# Patient Record
Sex: MALE | Race: WHITE | Employment: FULL TIME | URBAN - NONMETROPOLITAN AREA
[De-identification: names, ages, dates, MRNs, and addresses within clinical notes are randomized per-mention and may not be internally consistent; named-entity substitution may affect disease eponyms.]

---

## 2018-02-11 ENCOUNTER — HOSPITAL ENCOUNTER (EMERGENCY)
Age: 48
Discharge: HOME | End: 2018-02-11
Payer: COMMERCIAL

## 2018-02-11 VITALS
RESPIRATION RATE: 20 BRPM | HEART RATE: 96 BPM | OXYGEN SATURATION: 96 % | BODY MASS INDEX: 30.16 KG/M2 | HEIGHT: 74 IN | WEIGHT: 235 LBS | SYSTOLIC BLOOD PRESSURE: 146 MMHG | DIASTOLIC BLOOD PRESSURE: 92 MMHG | TEMPERATURE: 101.7 F

## 2018-02-11 DIAGNOSIS — J10.1 INFLUENZA B: Primary | ICD-10-CM

## 2018-02-11 LAB
FLU A ANTIGEN: NEGATIVE
FLU B ANTIGEN: POSITIVE
GROUP A STREP CULTURE, REFLEX: NEGATIVE
REFLEX THROAT C + S: NORMAL

## 2018-02-11 PROCEDURE — 87070 CULTURE OTHR SPECIMN AEROBIC: CPT

## 2018-02-11 PROCEDURE — 87804 INFLUENZA ASSAY W/OPTIC: CPT

## 2018-02-11 PROCEDURE — 99213 OFFICE O/P EST LOW 20 MIN: CPT

## 2018-02-11 PROCEDURE — 99213 OFFICE O/P EST LOW 20 MIN: CPT | Performed by: NURSE PRACTITIONER

## 2018-02-11 PROCEDURE — 87880 STREP A ASSAY W/OPTIC: CPT

## 2018-02-11 RX ORDER — OSELTAMIVIR PHOSPHATE 75 MG/1
75 CAPSULE ORAL 2 TIMES DAILY
Qty: 10 CAPSULE | Refills: 0 | Status: SHIPPED | OUTPATIENT
Start: 2018-02-11 | End: 2018-02-16

## 2018-02-11 ASSESSMENT — ENCOUNTER SYMPTOMS
RHINORRHEA: 0
TROUBLE SWALLOWING: 1
COUGH: 1

## 2018-02-11 ASSESSMENT — PAIN DESCRIPTION - PROGRESSION: CLINICAL_PROGRESSION: NOT CHANGED

## 2018-02-11 ASSESSMENT — PAIN DESCRIPTION - FREQUENCY: FREQUENCY: CONTINUOUS

## 2018-02-11 ASSESSMENT — PAIN DESCRIPTION - DESCRIPTORS: DESCRIPTORS: BURNING

## 2018-02-11 ASSESSMENT — PAIN DESCRIPTION - LOCATION: LOCATION: THROAT

## 2018-02-11 ASSESSMENT — PAIN SCALES - GENERAL: PAINLEVEL_OUTOF10: 6

## 2018-02-11 NOTE — ED PROVIDER NOTES
AYSHA Morgan JENNIFER Panchal [address]  Urgent Care Encounter      CHIEF COMPLAINT       Chief Complaint   Patient presents with    Generalized Body Aches     fears flu    Fatigue    Pharyngitis       Nurses Notes reviewed and I agree except as noted in the HPI. HISTORY OF PRESENT ILLNESS   Tracy Copeland is a 57 y.o. male who presents Sore throat, body aches, fever, fatigue x 3 days. No  was used. Pharyngitis   Location:  Anterior  Quality:  Sharp  Severity:  Severe  Onset quality:  Sudden  Duration:  3 days  Timing:  Intermittent (coughing)  Progression:  Waxing and waning  Chronicity:  New  Relieved by:  Nothing  Exacerbated by: coughing. Ineffective treatments:  None tried  Associated symptoms: chills, cough, fever, headaches and trouble swallowing    Associated symptoms: no rhinorrhea          REVIEW OF SYSTEMS     Review of Systems   Constitutional: Positive for chills and fever. HENT: Positive for trouble swallowing. Negative for rhinorrhea. Respiratory: Positive for cough. Musculoskeletal: Positive for myalgias. Neurological: Positive for headaches. All other systems reviewed and are negative. PAST MEDICAL HISTORY   History reviewed. No pertinent past medical history. SURGICAL HISTORY     Patient  has a past surgical history that includes Vasectomy (2000); cyst removal (1989); hernia repair (2008); and Spine surgery (03/2016). CURRENT MEDICATIONS       Previous Medications    No medications on file       ALLERGIES     Patient is has No Known Allergies. FAMILY HISTORY     Patient's family history includes Cancer in his maternal grandfather; Heart Disease in his father, maternal aunt, and maternal grandfather; High Blood Pressure in his father; High Cholesterol in his father; Stroke in his maternal grandfather. SOCIAL HISTORY     Patient  reports that he has been smoking Cigarettes. He has a 14.00 pack-year smoking history.  He has never used smokeless DISPOSITION Decision To Discharge 02/11/2018 10:32:31 AM    PATIENT REFERRED TO:  Naa Loaiza MD  [facility]  [phone]    In 1 week  If symptoms worsen    DISCHARGE MEDICATIONS:  New Prescriptions    OSELTAMIVIR (TAMIFLU) 75 MG CAPSULE    Take 1 capsule by mouth 2 times daily for 5 days     Current Discharge Medication List          Laurel Fischer, 20 Marina Montenegro CNP  02/11/18 [address] [place] [address]  02/11/18 [facility]

## 2018-02-11 NOTE — ED NOTES
Pt. Released in stable condition, ambulated per self to private car. Instructed pt to follow-up with family doctor as needed for recheck or go directly to the emergency department for any concerns/worsening conditions. Pt. Verbalized understanding of instructions. No questions at this time. RX in hand.       Damon Bruno RN  02/11/18 8677

## 2018-02-13 LAB — THROAT/NOSE CULTURE: NORMAL

## 2018-03-20 ENCOUNTER — HOSPITAL ENCOUNTER (EMERGENCY)
Age: 48
Discharge: HOME | End: 2018-03-20
Payer: COMMERCIAL

## 2018-03-20 ENCOUNTER — APPOINTMENT (OUTPATIENT)
Dept: GENERAL RADIOLOGY | Age: 48
End: 2018-03-20
Payer: COMMERCIAL

## 2018-03-20 VITALS
TEMPERATURE: 97.9 F | WEIGHT: 230 LBS | BODY MASS INDEX: 29.52 KG/M2 | DIASTOLIC BLOOD PRESSURE: 111 MMHG | SYSTOLIC BLOOD PRESSURE: 156 MMHG | HEART RATE: 96 BPM | OXYGEN SATURATION: 96 % | RESPIRATION RATE: 18 BRPM | HEIGHT: 74 IN

## 2018-03-20 DIAGNOSIS — S61.311A LACERATION OF LEFT INDEX FINGER WITH DAMAGE TO NAIL, FOREIGN BODY PRESENCE UNSPECIFIED, INITIAL ENCOUNTER: Primary | ICD-10-CM

## 2018-03-20 DIAGNOSIS — S62.631B OPEN DISPLACED FRACTURE OF DISTAL PHALANX OF LEFT INDEX FINGER, INITIAL ENCOUNTER: ICD-10-CM

## 2018-03-20 PROCEDURE — 12002 RPR S/N/AX/GEN/TRNK2.6-7.5CM: CPT

## 2018-03-20 PROCEDURE — 99282 EMERGENCY DEPT VISIT SF MDM: CPT

## 2018-03-20 PROCEDURE — 90471 IMMUNIZATION ADMIN: CPT | Performed by: NURSE PRACTITIONER

## 2018-03-20 PROCEDURE — 73140 X-RAY EXAM OF FINGER(S): CPT

## 2018-03-20 PROCEDURE — 90715 TDAP VACCINE 7 YRS/> IM: CPT | Performed by: NURSE PRACTITIONER

## 2018-03-20 PROCEDURE — 6360000002 HC RX W HCPCS: Performed by: NURSE PRACTITIONER

## 2018-03-20 RX ORDER — CEPHALEXIN 500 MG/1
500 CAPSULE ORAL 4 TIMES DAILY
Qty: 28 CAPSULE | Refills: 0 | Status: SHIPPED | OUTPATIENT
Start: 2018-03-20 | End: 2018-03-27

## 2018-03-20 RX ORDER — HYDROCODONE BITARTRATE AND ACETAMINOPHEN 5; 325 MG/1; MG/1
1 TABLET ORAL EVERY 6 HOURS PRN
Qty: 12 TABLET | Refills: 0 | Status: SHIPPED | OUTPATIENT
Start: 2018-03-20 | End: 2018-03-23

## 2018-03-20 RX ORDER — LIDOCAINE HYDROCHLORIDE 10 MG/ML
INJECTION, SOLUTION INFILTRATION; PERINEURAL
Status: DISCONTINUED
Start: 2018-03-20 | End: 2018-03-20 | Stop reason: HOSPADM

## 2018-03-20 RX ADMIN — TETANUS TOXOID, REDUCED DIPHTHERIA TOXOID AND ACELLULAR PERTUSSIS VACCINE, ADSORBED 0.5 ML: 5; 2.5; 8; 8; 2.5 SUSPENSION INTRAMUSCULAR at 17:52

## 2018-03-20 ASSESSMENT — PAIN DESCRIPTION - FREQUENCY: FREQUENCY: CONTINUOUS

## 2018-03-20 ASSESSMENT — PAIN DESCRIPTION - PAIN TYPE: TYPE: ACUTE PAIN

## 2018-03-20 ASSESSMENT — PAIN DESCRIPTION - ORIENTATION: ORIENTATION: LEFT

## 2018-03-20 ASSESSMENT — PAIN SCALES - GENERAL: PAINLEVEL_OUTOF10: 8

## 2018-03-20 ASSESSMENT — PAIN DESCRIPTION - LOCATION: LOCATION: FINGER (COMMENT WHICH ONE)

## 2018-03-20 ASSESSMENT — PAIN DESCRIPTION - DESCRIPTORS: DESCRIPTORS: BURNING

## 2018-03-20 NOTE — ED TRIAGE NOTES
Patient presents to the ED with complaints of a left, pointer finger injury. States he was throwing logs onto the back of his truck when he smashed his finger between a log and the tail-gait of his truck. Unsure of when last tetanus vaccination was given. Has not taken anything for the pain. No other questions or concerns at this time. Call light within reach.

## 2018-03-21 NOTE — ED PROVIDER NOTES
DEPARTMENT COURSE:   Vitals:    Vitals:    03/20/18 1725   BP: (!) 156/111   Pulse: 96   Resp: 18   Temp: 97.9 °F (36.6 °C)   TempSrc: Oral   SpO2: 96%   Weight: 230 lb (104.3 kg)   Height: 6' 2\" (1.88 m)       MDM    Patient seen and evaluated in the emergency department, patient appeared be no acute distress. Physical exam reveals a left finger that has a laceration and deformity. There is noted displacement of the fingernail bed. The hands are lightly soiled. Patient had a digital block completed, and x-ray, noted distal phalanx tuft fracture. No foreign bodies are noted. Patient's hand was placed in a 1:1:1 bath of Betadine, I didn't peroxide, and sterile water. Soaked for approximately 15 minutes. It was removed, additional anesthetic was applied. Reduction of the fracture and fingernail was completed, sutures were placed for additional support. Patient tolerated procedure well, I discussed my findings my plan of care the patient he was amenable with discharge. We will prophylactically put him on antibiotics, he is advised follow-up with orthopedics tomorrow appointment was made. He verbalized understanding of plan of care. All here in the emergency room he maintained stable course is appropriate for discharge. Medications   Tetanus-Diphth-Acell Pertussis (BOOSTRIX) injection 0.5 mL (0.5 mLs Intramuscular Given 3/20/18 1752)       Patient was seen independently by myself. The patient's final impression and disposition and plan was determined by myself.      CRITICAL CARE:   None    CONSULTS:  None    PROCEDURES:  Lac Repair  Date/Time: 3/20/2018 9:20 PM  Performed by: Chelsy Long  Authorized by: Chelsy Long     Consent:     Consent obtained:  Verbal    Consent given by:  Patient    Risks discussed:  Infection, need for additional repair, nerve damage, pain, poor cosmetic result, poor wound healing, retained foreign body, tendon damage and vascular damage    Alternatives discussed:  No DISPOSITION/PLAN   Patient discharged in stable condition    PATIENT REFERRED TO:  Lena Rivers 92 6  [address] [facility] [address]  Go in 1 day  Appointment made for 3/21/18 at [facility]:  Discharge Medication List as of 3/20/2018  6:34 PM      START taking these medications    Details   cephALEXin (KEFLEX) 500 MG capsule Take 1 capsule by mouth 4 times daily for 7 days, Disp-28 capsule, R-0Print      HYDROcodone-acetaminophen (NORCO) 5-325 MG per tablet Take 1 tablet by mouth every 6 hours as needed for Pain for up to 3 days . Take lowest dose possible to manage pain., Disp-12 tablet, R-0Print             (Please note that portions of this note were completed with a voice recognition program.  Efforts were made to edit the dictations but occasionally words are mis-transcribed.)      Provider:  I personally performed the services described in the documentation, reviewed and edited the documentation which was dictated to the scribe in my presence, and it accurately records my words and actions.     Dylan Sherman CNP 03/20/18 9:14 PM    IVAN Segura NP  03/20/18 0920

## 2019-03-15 ENCOUNTER — OFFICE VISIT (OUTPATIENT)
Dept: FAMILY MEDICINE CLINIC | Age: 49
End: 2019-03-15
Payer: COMMERCIAL

## 2019-03-15 VITALS
TEMPERATURE: 98 F | BODY MASS INDEX: 28.22 KG/M2 | WEIGHT: 219.8 LBS | DIASTOLIC BLOOD PRESSURE: 78 MMHG | HEART RATE: 78 BPM | RESPIRATION RATE: 16 BRPM | SYSTOLIC BLOOD PRESSURE: 115 MMHG

## 2019-03-15 DIAGNOSIS — M72.2 PLANTAR FASCIITIS: Primary | ICD-10-CM

## 2019-03-15 PROCEDURE — 96372 THER/PROPH/DIAG INJ SC/IM: CPT | Performed by: FAMILY MEDICINE

## 2019-03-15 PROCEDURE — 99213 OFFICE O/P EST LOW 20 MIN: CPT | Performed by: FAMILY MEDICINE

## 2019-03-15 RX ORDER — METHYLPREDNISOLONE ACETATE 80 MG/ML
160 INJECTION, SUSPENSION INTRA-ARTICULAR; INTRALESIONAL; INTRAMUSCULAR; SOFT TISSUE ONCE
Status: COMPLETED | OUTPATIENT
Start: 2019-03-15 | End: 2019-03-15

## 2019-03-15 RX ADMIN — METHYLPREDNISOLONE ACETATE 160 MG: 80 INJECTION, SUSPENSION INTRA-ARTICULAR; INTRALESIONAL; INTRAMUSCULAR; SOFT TISSUE at 07:57

## 2019-03-15 ASSESSMENT — ENCOUNTER SYMPTOMS
ABDOMINAL DISTENTION: 0
RHINORRHEA: 0
DIARRHEA: 0
CONSTIPATION: 0
SORE THROAT: 0
COUGH: 0
SINUS PRESSURE: 0
EYE PAIN: 0
SHORTNESS OF BREATH: 0
ABDOMINAL PAIN: 0
NAUSEA: 0

## 2019-03-15 ASSESSMENT — PATIENT HEALTH QUESTIONNAIRE - PHQ9
SUM OF ALL RESPONSES TO PHQ QUESTIONS 1-9: 0
2. FEELING DOWN, DEPRESSED OR HOPELESS: 0
SUM OF ALL RESPONSES TO PHQ9 QUESTIONS 1 & 2: 0
SUM OF ALL RESPONSES TO PHQ QUESTIONS 1-9: 0
1. LITTLE INTEREST OR PLEASURE IN DOING THINGS: 0

## 2019-10-18 ENCOUNTER — OFFICE VISIT (OUTPATIENT)
Dept: FAMILY MEDICINE CLINIC | Age: 49
End: 2019-10-18
Payer: COMMERCIAL

## 2019-10-18 VITALS
TEMPERATURE: 98.7 F | BODY MASS INDEX: 29.16 KG/M2 | RESPIRATION RATE: 20 BRPM | HEIGHT: 73 IN | DIASTOLIC BLOOD PRESSURE: 94 MMHG | HEART RATE: 76 BPM | WEIGHT: 220 LBS | SYSTOLIC BLOOD PRESSURE: 130 MMHG

## 2019-10-18 DIAGNOSIS — R03.0 ELEVATED BLOOD PRESSURE READING: ICD-10-CM

## 2019-10-18 DIAGNOSIS — R53.83 FATIGUE, UNSPECIFIED TYPE: Primary | ICD-10-CM

## 2019-10-18 PROCEDURE — 99213 OFFICE O/P EST LOW 20 MIN: CPT | Performed by: FAMILY MEDICINE

## 2019-10-21 ENCOUNTER — NURSE ONLY (OUTPATIENT)
Dept: LAB | Age: 49
End: 2019-10-21

## 2019-10-21 DIAGNOSIS — R53.83 FATIGUE, UNSPECIFIED TYPE: ICD-10-CM

## 2019-10-21 LAB
ALBUMIN SERPL-MCNC: 4.5 G/DL (ref 3.5–5.1)
ALP BLD-CCNC: 62 U/L (ref 38–126)
ALT SERPL-CCNC: 20 U/L (ref 11–66)
ANION GAP SERPL CALCULATED.3IONS-SCNC: 12 MEQ/L (ref 8–16)
AST SERPL-CCNC: 23 U/L (ref 5–40)
BASOPHILS # BLD: 0.6 %
BASOPHILS ABSOLUTE: 0 THOU/MM3 (ref 0–0.1)
BILIRUB SERPL-MCNC: 0.6 MG/DL (ref 0.3–1.2)
BUN BLDV-MCNC: 16 MG/DL (ref 7–22)
CALCIUM SERPL-MCNC: 9.3 MG/DL (ref 8.5–10.5)
CHLORIDE BLD-SCNC: 103 MEQ/L (ref 98–111)
CHOLESTEROL, TOTAL: 151 MG/DL (ref 100–199)
CO2: 25 MEQ/L (ref 23–33)
CREAT SERPL-MCNC: 0.9 MG/DL (ref 0.4–1.2)
EOSINOPHIL # BLD: 0.8 %
EOSINOPHILS ABSOLUTE: 0.1 THOU/MM3 (ref 0–0.4)
ERYTHROCYTE [DISTWIDTH] IN BLOOD BY AUTOMATED COUNT: 12.6 % (ref 11.5–14.5)
ERYTHROCYTE [DISTWIDTH] IN BLOOD BY AUTOMATED COUNT: 44.3 FL (ref 35–45)
GFR SERPL CREATININE-BSD FRML MDRD: > 90 ML/MIN/1.73M2
GLUCOSE BLD-MCNC: 85 MG/DL (ref 70–108)
HCT VFR BLD CALC: 45.1 % (ref 42–52)
HDLC SERPL-MCNC: 37 MG/DL
HEMOGLOBIN: 14.7 GM/DL (ref 14–18)
IMMATURE GRANS (ABS): 0.01 THOU/MM3 (ref 0–0.07)
IMMATURE GRANULOCYTES: 0.1 %
LDL CHOLESTEROL CALCULATED: 93 MG/DL
LYMPHOCYTES # BLD: 27.1 %
LYMPHOCYTES ABSOLUTE: 1.9 THOU/MM3 (ref 1–4.8)
MCH RBC QN AUTO: 31.4 PG (ref 26–33)
MCHC RBC AUTO-ENTMCNC: 32.6 GM/DL (ref 32.2–35.5)
MCV RBC AUTO: 96.4 FL (ref 80–94)
MONOCYTES # BLD: 8.3 %
MONOCYTES ABSOLUTE: 0.6 THOU/MM3 (ref 0.4–1.3)
NUCLEATED RED BLOOD CELLS: 0 /100 WBC
PLATELET # BLD: 224 THOU/MM3 (ref 130–400)
PMV BLD AUTO: 9.9 FL (ref 9.4–12.4)
POTASSIUM SERPL-SCNC: 4.3 MEQ/L (ref 3.5–5.2)
RBC # BLD: 4.68 MILL/MM3 (ref 4.7–6.1)
SEG NEUTROPHILS: 63.1 %
SEGMENTED NEUTROPHILS ABSOLUTE COUNT: 4.5 THOU/MM3 (ref 1.8–7.7)
SODIUM BLD-SCNC: 140 MEQ/L (ref 135–145)
T4 FREE: 1.2 NG/DL (ref 0.93–1.76)
TOTAL PROTEIN: 7 G/DL (ref 6.1–8)
TRIGL SERPL-MCNC: 104 MG/DL (ref 0–199)
TSH SERPL DL<=0.05 MIU/L-ACNC: 2.12 UIU/ML (ref 0.4–4.2)
WBC # BLD: 7.1 THOU/MM3 (ref 4.8–10.8)

## 2019-10-21 ASSESSMENT — ENCOUNTER SYMPTOMS
SHORTNESS OF BREATH: 0
NAUSEA: 0
COUGH: 0
SORE THROAT: 0
ABDOMINAL DISTENTION: 0
SINUS PRESSURE: 0
EYE PAIN: 0
DIARRHEA: 0
RHINORRHEA: 0
CONSTIPATION: 0
ABDOMINAL PAIN: 0

## 2019-12-06 ENCOUNTER — OFFICE VISIT (OUTPATIENT)
Dept: FAMILY MEDICINE CLINIC | Age: 49
End: 2019-12-06
Payer: COMMERCIAL

## 2019-12-06 VITALS
RESPIRATION RATE: 16 BRPM | SYSTOLIC BLOOD PRESSURE: 132 MMHG | WEIGHT: 230 LBS | HEART RATE: 81 BPM | BODY MASS INDEX: 30.14 KG/M2 | TEMPERATURE: 98.2 F | DIASTOLIC BLOOD PRESSURE: 98 MMHG

## 2019-12-06 DIAGNOSIS — I10 ESSENTIAL HYPERTENSION: Primary | ICD-10-CM

## 2019-12-06 PROCEDURE — 99213 OFFICE O/P EST LOW 20 MIN: CPT | Performed by: FAMILY MEDICINE

## 2019-12-06 RX ORDER — LISINOPRIL 10 MG/1
10 TABLET ORAL DAILY
Qty: 30 TABLET | Refills: 1 | Status: SHIPPED | OUTPATIENT
Start: 2019-12-06 | End: 2020-01-10 | Stop reason: SDUPTHER

## 2019-12-08 ASSESSMENT — ENCOUNTER SYMPTOMS
EYE PAIN: 0
COUGH: 0
NAUSEA: 0
CONSTIPATION: 0
ABDOMINAL DISTENTION: 0
SORE THROAT: 0
SINUS PRESSURE: 0
ABDOMINAL PAIN: 0
RHINORRHEA: 0
DIARRHEA: 0
SHORTNESS OF BREATH: 0

## 2020-01-10 ENCOUNTER — OFFICE VISIT (OUTPATIENT)
Dept: FAMILY MEDICINE CLINIC | Age: 50
End: 2020-01-10
Payer: COMMERCIAL

## 2020-01-10 VITALS
BODY MASS INDEX: 29.09 KG/M2 | WEIGHT: 222 LBS | SYSTOLIC BLOOD PRESSURE: 122 MMHG | HEART RATE: 71 BPM | DIASTOLIC BLOOD PRESSURE: 92 MMHG | RESPIRATION RATE: 16 BRPM | OXYGEN SATURATION: 98 %

## 2020-01-10 PROCEDURE — 99213 OFFICE O/P EST LOW 20 MIN: CPT | Performed by: FAMILY MEDICINE

## 2020-01-10 RX ORDER — LISINOPRIL 20 MG/1
20 TABLET ORAL DAILY
Qty: 30 TABLET | Refills: 5 | Status: SHIPPED | OUTPATIENT
Start: 2020-01-10 | End: 2020-08-17

## 2020-01-10 ASSESSMENT — PATIENT HEALTH QUESTIONNAIRE - PHQ9
SUM OF ALL RESPONSES TO PHQ QUESTIONS 1-9: 0
SUM OF ALL RESPONSES TO PHQ9 QUESTIONS 1 & 2: 0
1. LITTLE INTEREST OR PLEASURE IN DOING THINGS: 0
SUM OF ALL RESPONSES TO PHQ QUESTIONS 1-9: 0
2. FEELING DOWN, DEPRESSED OR HOPELESS: 0

## 2020-01-12 ASSESSMENT — ENCOUNTER SYMPTOMS
EYE PAIN: 0
NAUSEA: 0
SORE THROAT: 0
SINUS PRESSURE: 0
SHORTNESS OF BREATH: 0
ABDOMINAL DISTENTION: 0
ABDOMINAL PAIN: 0
DIARRHEA: 0
RHINORRHEA: 0
COUGH: 0
CONSTIPATION: 0

## 2020-01-12 NOTE — PROGRESS NOTES
Reviewed health maintenance. Patient agreedwith treatment plan. Follow up as directed. **This report has been created using voice recognition software. It may contain minor errorswhich are inherent in voice recognition technology. **       Electronically signed by Abril Omer MD on 1/12/2020 at 12:21 PM

## 2020-08-17 RX ORDER — LISINOPRIL 20 MG/1
TABLET ORAL
Qty: 30 TABLET | Refills: 0 | Status: SHIPPED | OUTPATIENT
Start: 2020-08-17 | End: 2020-08-21 | Stop reason: SDUPTHER

## 2020-08-21 ENCOUNTER — VIRTUAL VISIT (OUTPATIENT)
Dept: FAMILY MEDICINE CLINIC | Age: 50
End: 2020-08-21
Payer: COMMERCIAL

## 2020-08-21 PROCEDURE — 99213 OFFICE O/P EST LOW 20 MIN: CPT | Performed by: FAMILY MEDICINE

## 2020-08-21 RX ORDER — LISINOPRIL 20 MG/1
20 TABLET ORAL DAILY
Qty: 30 TABLET | Refills: 11 | Status: SHIPPED | OUTPATIENT
Start: 2020-08-21 | End: 2021-07-29 | Stop reason: SDUPTHER

## 2020-08-21 ASSESSMENT — ENCOUNTER SYMPTOMS
ABDOMINAL DISTENTION: 0
SINUS PRESSURE: 0
SHORTNESS OF BREATH: 0
SORE THROAT: 0
DIARRHEA: 0
NAUSEA: 0
COUGH: 0
CONSTIPATION: 0
EYE PAIN: 0
ABDOMINAL PAIN: 0
RHINORRHEA: 0

## 2020-08-21 NOTE — PROGRESS NOTES
2020    TELEHEALTH EVALUATION -- Audio/Visual (During IVUVA-06 public health emergency)    HPI:    Reina Webb (:  1970) has requested an audio/video evaluation for the following concern(s):    Follow-up hypertension. Blood pressures been running in the 130s over 80s and 90s at home. He is feeling well denies any symptoms taking medication regularly. 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 HENT: Negative for congestion, ear pain, postnasal drip, rhinorrhea, sinus pressure and sore throat. Eyes: Negative for pain and visual disturbance. Respiratory: Negative for cough and shortness of breath. Cardiovascular: Negative for chest pain. Gastrointestinal: Negative for abdominal distention, abdominal pain, constipation, diarrhea and nausea. Genitourinary: Negative for dysuria, frequency and urgency. Musculoskeletal: Negative for arthralgias. Skin: Negative for rash. Neurological: Negative for dizziness. Prior to Visit Medications    Medication Sig Taking? Authorizing Provider   lisinopril (PRINIVIL;ZESTRIL) 20 MG tablet Take 1 tablet by mouth daily Yes Mckayla Corona MD       Social History     Tobacco Use    Smoking status: Current Every Day Smoker     Packs/day: 0.50     Years: 15.00     Pack years: 7.50     Types: Cigarettes    Smokeless tobacco: Never Used    Tobacco comment: 10/18/19 down to 1ppd   Substance Use Topics    Alcohol use:  Yes     Alcohol/week: 0.0 standard drinks     Comment: social    Drug use: No        No Known Allergies, No past medical history on file.,   Past Surgical History:   Procedure Laterality Date    CYST REMOVAL      Lt Wrist    HERNIA REPAIR      SPINE SURGERY  2016    OIS - C4,5,6    VASECTOMY     ,   Social History     Tobacco Use    Smoking status: Current Every Day Smoker     Packs/day: 0.50     Years: 15.00     Pack years: 7.50     Types: Cigarettes    Smokeless tobacco: Never Used    Tobacco comment: 10/18/19 down to 1ppd   Substance Use Topics    Alcohol use: Yes     Alcohol/week: 0.0 standard drinks     Comment: social    Drug use: No   ,   Family History   Problem Relation Age of Onset    High Blood Pressure Father     High Cholesterol Father     Heart Disease Father     Heart Disease Maternal Aunt     Cancer Maternal Grandfather         lung    Stroke Maternal Grandfather     Heart Disease Maternal Grandfather    ,   Immunization History   Administered Date(s) Administered    Influenza Virus Vaccine 10/04/2019    Tdap (Boostrix, Adacel) 03/20/2018   ,   Health Maintenance   Topic Date Due    Pneumococcal 0-64 years Vaccine (1 of 1 - PPSV23) 01/21/1978    HIV screen  01/21/1987    Flu vaccine (1) 09/01/2020    Potassium monitoring  10/21/2020    Creatinine monitoring  10/21/2020    Lipid screen  10/21/2024    DTaP/Tdap/Td vaccine (2 - Td) 03/20/2028    Hepatitis A vaccine  Aged Out    Hepatitis B vaccine  Aged Out    Hib vaccine  Aged Out    Meningococcal (ACWY) vaccine  Aged Out       PHYSICAL EXAMINATION:  [ INSTRUCTIONS:  \"[x]\" Indicates a positive item  \"[]\" Indicates a negative item  -- DELETE ALL ITEMS NOT EXAMINED]  Vital Signs: (As obtained by patient/caregiver or practitioner observation)    Blood pressure- 130/88 Heart rate-    Respiratory rate-    Temperature-  Pulse oximetry-     Constitutional: [x] Appears well-developed and well-nourished [x] No apparent distress      [] Abnormal-   Mental status  [x] Alert and awake  [x] Oriented to person/place/time [x]Able to follow commands      Eyes:  EOM    [x]  Normal  [] Abnormal-  Sclera  [x]  Normal  [] Abnormal -         Discharge [x]  None visible  [] Abnormal -    HENT:   [x] Normocephalic, atraumatic.   [] Abnormal   [] Mouth/Throat: Mucous membranes are moist. External Ears [] Normal  [] Abnormal-     Neck: [] No visualized mass     Pulmonary/Chest: [x] Respiratory effort normal.  [x] No visualized signs of difficulty breathing or respiratory distress        [] Abnormal-      Musculoskeletal:   [] Normal gait with no signs of ataxia         [] Normal range of motion of neck        [] Abnormal-       Neurological:        [x] No Facial Asymmetry (Cranial nerve 7 motor function) (limited exam to video visit)          [x] No gaze palsy        [] Abnormal-         Skin:        [x] No significant exanthematous lesions or discoloration noted on facial skin         [] Abnormal-            Psychiatric:       [x] Normal Affect [] No Hallucinations        [] Abnormal-     Other pertinent observable physical exam findings-     ASSESSMENT/PLAN:   Diagnosis Orders   1. Essential hypertension  lisinopril (PRINIVIL;ZESTRIL) 20 MG tablet    Lipid Panel    Comprehensive Metabolic Panel    CBC Auto Differential     Requested Prescriptions     Signed Prescriptions Disp Refills    lisinopril (PRINIVIL;ZESTRIL) 20 MG tablet 30 tablet 11     Sig: Take 1 tablet by mouth daily     Orders Placed This Encounter   Procedures    Lipid Panel     Standing Status:   Future     Standing Expiration Date:   8/21/2021     Order Specific Question:   Is Patient Fasting?/# of Hours     Answer:   yes/12    Comprehensive Metabolic Panel     Standing Status:   Future     Standing Expiration Date:   8/21/2021    CBC Auto Differential     Standing Status:   Future     Standing Expiration Date:   8/21/2021         No follow-ups on file. Shruti Stock is a 52 y.o. male being evaluated by a Virtual Visit (video visit) encounter to address concerns as mentioned above. A caregiver was present when appropriate.  Due to this being a TeleHealth encounter (During [place] [address] public [facility] emergency), evaluation of the following organ systems was limited:

## 2021-06-15 ENCOUNTER — NURSE ONLY (OUTPATIENT)
Dept: LAB | Age: 51
End: 2021-06-15

## 2021-06-15 DIAGNOSIS — I10 ESSENTIAL HYPERTENSION: ICD-10-CM

## 2021-06-15 LAB
ALBUMIN SERPL-MCNC: 4.7 G/DL (ref 3.5–5.1)
ALP BLD-CCNC: 60 U/L (ref 38–126)
ALT SERPL-CCNC: 30 U/L (ref 11–66)
ANION GAP SERPL CALCULATED.3IONS-SCNC: 7 MEQ/L (ref 8–16)
AST SERPL-CCNC: 22 U/L (ref 5–40)
BASOPHILS # BLD: 0.8 %
BASOPHILS ABSOLUTE: 0.1 THOU/MM3 (ref 0–0.1)
BILIRUB SERPL-MCNC: 0.3 MG/DL (ref 0.3–1.2)
BUN BLDV-MCNC: 12 MG/DL (ref 7–22)
CALCIUM SERPL-MCNC: 9.6 MG/DL (ref 8.5–10.5)
CHLORIDE BLD-SCNC: 104 MEQ/L (ref 98–111)
CHOLESTEROL, TOTAL: 191 MG/DL (ref 100–199)
CO2: 27 MEQ/L (ref 23–33)
CREAT SERPL-MCNC: 0.9 MG/DL (ref 0.4–1.2)
EOSINOPHIL # BLD: 1.9 %
EOSINOPHILS ABSOLUTE: 0.1 THOU/MM3 (ref 0–0.4)
ERYTHROCYTE [DISTWIDTH] IN BLOOD BY AUTOMATED COUNT: 12.5 % (ref 11.5–14.5)
ERYTHROCYTE [DISTWIDTH] IN BLOOD BY AUTOMATED COUNT: 45 FL (ref 35–45)
GFR SERPL CREATININE-BSD FRML MDRD: 90 ML/MIN/1.73M2
GLUCOSE BLD-MCNC: 119 MG/DL (ref 70–108)
HCT VFR BLD CALC: 50.5 % (ref 42–52)
HDLC SERPL-MCNC: 40 MG/DL
HEMOGLOBIN: 16.3 GM/DL (ref 14–18)
IMMATURE GRANS (ABS): 0.02 THOU/MM3 (ref 0–0.07)
IMMATURE GRANULOCYTES: 0.3 %
LDL CHOLESTEROL CALCULATED: 119 MG/DL
LYMPHOCYTES # BLD: 31.2 %
LYMPHOCYTES ABSOLUTE: 2 THOU/MM3 (ref 1–4.8)
MCH RBC QN AUTO: 31.7 PG (ref 26–33)
MCHC RBC AUTO-ENTMCNC: 32.3 GM/DL (ref 32.2–35.5)
MCV RBC AUTO: 98.1 FL (ref 80–94)
MONOCYTES # BLD: 9 %
MONOCYTES ABSOLUTE: 0.6 THOU/MM3 (ref 0.4–1.3)
NUCLEATED RED BLOOD CELLS: 0 /100 WBC
PLATELET # BLD: 233 THOU/MM3 (ref 130–400)
PMV BLD AUTO: 9.6 FL (ref 9.4–12.4)
POTASSIUM SERPL-SCNC: 4.4 MEQ/L (ref 3.5–5.2)
RBC # BLD: 5.15 MILL/MM3 (ref 4.7–6.1)
SEG NEUTROPHILS: 56.8 %
SEGMENTED NEUTROPHILS ABSOLUTE COUNT: 3.7 THOU/MM3 (ref 1.8–7.7)
SODIUM BLD-SCNC: 138 MEQ/L (ref 135–145)
TOTAL PROTEIN: 7.2 G/DL (ref 6.1–8)
TRIGL SERPL-MCNC: 161 MG/DL (ref 0–199)
WBC # BLD: 6.5 THOU/MM3 (ref 4.8–10.8)

## 2021-07-28 RX ORDER — DEXAMETHASONE SODIUM PHOSPHATE 4 MG/ML
4 INJECTION, SOLUTION INTRA-ARTICULAR; INTRALESIONAL; INTRAMUSCULAR; INTRAVENOUS; SOFT TISSUE ONCE
Status: CANCELLED | OUTPATIENT
Start: 2021-07-28 | End: 2021-07-28

## 2021-07-29 ENCOUNTER — OFFICE VISIT (OUTPATIENT)
Dept: FAMILY MEDICINE CLINIC | Age: 51
End: 2021-07-29
Payer: COMMERCIAL

## 2021-07-29 ENCOUNTER — HOSPITAL ENCOUNTER (OUTPATIENT)
Dept: INTERVENTIONAL RADIOLOGY/VASCULAR | Age: 51
Discharge: HOME | End: 2021-07-29
Payer: COMMERCIAL

## 2021-07-29 VITALS
OXYGEN SATURATION: 97 % | HEIGHT: 72 IN | HEART RATE: 71 BPM | SYSTOLIC BLOOD PRESSURE: 131 MMHG | TEMPERATURE: 97.3 F | DIASTOLIC BLOOD PRESSURE: 90 MMHG | BODY MASS INDEX: 31.18 KG/M2 | RESPIRATION RATE: 18 BRPM | WEIGHT: 230.2 LBS

## 2021-07-29 VITALS
HEART RATE: 91 BPM | DIASTOLIC BLOOD PRESSURE: 78 MMHG | HEIGHT: 74 IN | RESPIRATION RATE: 16 BRPM | SYSTOLIC BLOOD PRESSURE: 138 MMHG | TEMPERATURE: 98.3 F | WEIGHT: 231.5 LBS | BODY MASS INDEX: 29.71 KG/M2

## 2021-07-29 DIAGNOSIS — R52 PAIN: ICD-10-CM

## 2021-07-29 DIAGNOSIS — R73.9 HYPERGLYCEMIA: ICD-10-CM

## 2021-07-29 DIAGNOSIS — Z00.00 ROUTINE GENERAL MEDICAL EXAMINATION AT A HEALTH CARE FACILITY: Primary | ICD-10-CM

## 2021-07-29 DIAGNOSIS — I10 ESSENTIAL HYPERTENSION: ICD-10-CM

## 2021-07-29 LAB — HBA1C MFR BLD: 5.7 %

## 2021-07-29 PROCEDURE — 6360000004 HC RX CONTRAST MEDICATION: Performed by: RADIOLOGY

## 2021-07-29 PROCEDURE — 62321 NJX INTERLAMINAR CRV/THRC: CPT | Performed by: RADIOLOGY

## 2021-07-29 PROCEDURE — 6360000002 HC RX W HCPCS: Performed by: RADIOLOGY

## 2021-07-29 PROCEDURE — 99396 PREV VISIT EST AGE 40-64: CPT | Performed by: FAMILY MEDICINE

## 2021-07-29 PROCEDURE — 83036 HEMOGLOBIN GLYCOSYLATED A1C: CPT | Performed by: FAMILY MEDICINE

## 2021-07-29 PROCEDURE — 2580000003 HC RX 258: Performed by: RADIOLOGY

## 2021-07-29 PROCEDURE — 2709999900 IR FLUORO GUIDED LUMBAR PUNCTURE THERAPY

## 2021-07-29 RX ORDER — LISINOPRIL 20 MG/1
20 TABLET ORAL DAILY
Qty: 90 TABLET | Refills: 3 | Status: SHIPPED | OUTPATIENT
Start: 2021-07-29

## 2021-07-29 RX ORDER — DEXAMETHASONE SODIUM PHOSPHATE 4 MG/ML
4 INJECTION, SOLUTION INTRA-ARTICULAR; INTRALESIONAL; INTRAMUSCULAR; INTRAVENOUS; SOFT TISSUE ONCE
Status: COMPLETED | OUTPATIENT
Start: 2021-07-29 | End: 2021-07-29

## 2021-07-29 RX ADMIN — IOHEXOL 1 ML: 180 INJECTION INTRAVENOUS at 08:51

## 2021-07-29 RX ADMIN — DEXAMETHASONE SODIUM PHOSPHATE 4 MG: 4 INJECTION, SOLUTION INTRAMUSCULAR; INTRAVENOUS at 08:51

## 2021-07-29 RX ADMIN — WATER 1 ML: 1 INJECTION INTRAMUSCULAR; INTRAVENOUS; SUBCUTANEOUS at 08:51

## 2021-07-29 SDOH — ECONOMIC STABILITY: FOOD INSECURITY: WITHIN THE PAST 12 MONTHS, YOU WORRIED THAT YOUR FOOD WOULD RUN OUT BEFORE YOU GOT MONEY TO BUY MORE.: NEVER TRUE

## 2021-07-29 SDOH — ECONOMIC STABILITY: FOOD INSECURITY: WITHIN THE PAST 12 MONTHS, THE FOOD YOU BOUGHT JUST DIDN'T LAST AND YOU DIDN'T HAVE MONEY TO GET MORE.: NEVER TRUE

## 2021-07-29 ASSESSMENT — PATIENT HEALTH QUESTIONNAIRE - PHQ9
SUM OF ALL RESPONSES TO PHQ QUESTIONS 1-9: 0
SUM OF ALL RESPONSES TO PHQ9 QUESTIONS 1 & 2: 0
2. FEELING DOWN, DEPRESSED OR HOPELESS: 0
1. LITTLE INTEREST OR PLEASURE IN DOING THINGS: 0

## 2021-07-29 ASSESSMENT — PAIN - FUNCTIONAL ASSESSMENT: PAIN_FUNCTIONAL_ASSESSMENT: 0-10

## 2021-07-29 ASSESSMENT — PAIN SCALES - GENERAL: PAINLEVEL_OUTOF10: 3

## 2021-07-29 ASSESSMENT — PAIN DESCRIPTION - PAIN TYPE: TYPE: CHRONIC PAIN

## 2021-07-29 ASSESSMENT — PAIN DESCRIPTION - DESCRIPTORS: DESCRIPTORS: ACHING

## 2021-07-29 ASSESSMENT — SOCIAL DETERMINANTS OF HEALTH (SDOH): HOW HARD IS IT FOR YOU TO PAY FOR THE VERY BASICS LIKE FOOD, HOUSING, MEDICAL CARE, AND HEATING?: NOT HARD AT ALL

## 2021-07-29 ASSESSMENT — PAIN DESCRIPTION - LOCATION: LOCATION: NECK;SHOULDER

## 2021-07-29 ASSESSMENT — PAIN DESCRIPTION - ORIENTATION: ORIENTATION: LEFT

## 2021-07-29 NOTE — OP NOTE
Department of Radiology  Post Procedure Progress Note      Pre-Procedure Diagnosis:  Cervical radiculopathy     Procedure Performed:  Epidural block/steroid injection      Anesthesia: local     Findings: successful    Immediate Complications:  None    Estimated Blood Loss: minimal    SEE DICTATED PROCEDURE NOTE FOR COMPLETE DETAILS.       Abdirashid Gonzales MD   7/29/2021 8:53 AM

## 2021-07-29 NOTE — PROGRESS NOTES
9600 Patient arrived to [facility] ambulatory for nerve block. Oriented to room and call light  PT RIGHTS AND RESPONSIBILITIES OFFERED TO PT. He states his wife is driving, he has been NPO since 7/28/21 and he does not take blood thinners. Hand grasps and pedal push and pull are strong and equal.  He denies numbness    0902  Patient returns to [facility]. Dressing clean, dry, intact. Hand grasps strong and equal and he denies numbness. 0915 Dressing clean, dry, intact. Hand grasps strong and equal and he denies numbness    0925  Discharge instructions given and reviewed and he denies questions.   Discharged ambulatory       __M__ Safety:       (Environmental)  Delta Galeazzi to environment   Ensure ID band is correct and in place/ allergy band as needed   Assess for fall risk   Initiate fall precautions as applicable (fall band, side rails, etc.)   Call light within reach   Bed in low position/ wheels locked    _M___ Pain:        Assess pain level and characteristics   Administer analgesics as ordered   Assess effectiveness of pain management and report to MD as needed    __M__ Knowledge Deficit:   Assess baseline knowledge   Provide teaching at level of understanding   Provide teaching via preferred learning method   Evaluate teaching effectiveness    __M__ Hemodynamic/Respiratory Status:       (Pre and Post Procedure Monitoring)   Assess/Monitor vital signs and LOC   Assess Baseline SpO2 prior to any sedation   Obtain weight/height   Assess vital signs/ LOC until patient meets discharge criteria   Monitor procedure site and notify MD of any issues

## 2021-07-29 NOTE — PATIENT INSTRUCTIONS
THE MOST IMPORTANT ACTION YOU CAN TAKE TO IMPROVE YOUR CURRENT AND FUTURE HEALTH IS TO QUIT SMOKING. Call the [facility] NOW ([phone])    Smoking harms nonsmokers. When nonsmokers are around people who smoke, they absorb nicotine, carbon monoxide, and other ingredients of tobacco smoke. DO NOT SMOKE AROUND CHILDREN    Children exposed to secondhand smoke are at an increased risk of:  Sudden Infant Death Syndrome (SIDS), acute respiratory infections, inflammation of the middle ear, and severe asthma. Over a longer time, it causes heart disease and lung cancer. There is no safe level of exposure to secondhand smoke.

## 2021-07-29 NOTE — H&P
Formulation and discussion of sedation / procedure plans, risks, benefits, side effects and alternatives with patient and/or responsible adult completed.     Electronically signed by Savi Fiore MD on 7/29/2021 at 8:53 AM

## 2021-07-31 ASSESSMENT — ENCOUNTER SYMPTOMS
SHORTNESS OF BREATH: 0
BACK PAIN: 0
WHEEZING: 0
ABDOMINAL PAIN: 0
COUGH: 0
VOMITING: 0
DIARRHEA: 0
NAUSEA: 0
SORE THROAT: 0
RHINORRHEA: 0
CONSTIPATION: 0

## 2021-07-31 NOTE — PROGRESS NOTES
[address] [address]  Dept: [phone]  Dept Fax: [phone]  Loc: [phone]  PROGRESS NOTE      VisitDate: 7/29/2021    Jayy Francisco is a 54 y.o. male who presents today for:     Chief Complaint   Patient presents with    Annual Exam     HTN, discuss labs, had cortizone shot in neck this morning         Subjective:  HPI  Patient comes in for annual wellness. Had labs done these were reviewed with the patient blood sugar was slightly elevated A1c in the office was normal.  History of hypertension is stable    Review of Systems   Constitutional: Negative for chills, fatigue and fever. HENT: Negative for congestion, rhinorrhea and sore throat. Respiratory: Negative for cough, shortness of breath and wheezing. Cardiovascular: Negative for chest pain, palpitations and leg swelling. Gastrointestinal: Negative for abdominal pain, constipation, diarrhea, nausea and vomiting. Genitourinary: Negative for dysuria, frequency and urgency. Musculoskeletal: Negative for arthralgias, back pain and joint swelling. Skin: Negative for rash. Neurological: Negative for dizziness, light-headedness, numbness and headaches.      Past Medical History:   Diagnosis Date    Hypertension       Past Surgical History:   Procedure Laterality Date    CYST REMOVAL  1989    Lt Wrist    HERNIA REPAIR  2008    SPINE SURGERY  03/2016    OIS - C4,5,6    VASECTOMY  2000     Family History   Problem Relation Age of Onset    High Blood Pressure Father     High Cholesterol Father     Heart Disease Father     Heart Disease Maternal Aunt     Cancer Maternal Grandfather         lung    Stroke Maternal Grandfather     Heart Disease Maternal Grandfather      Social History     Tobacco Use    Smoking status: Current Every Day Smoker     Packs/day: 1.00     Years: 15.00     Pack years: 15.00     Types: Cigarettes    Smokeless tobacco: Never Used    Tobacco comment: 10/18/19 down to 1ppd   Substance Use Topics    Alcohol use: Yes     Alcohol/week: 6.0 standard drinks     Types: 6 Cans of beer per week      Current Outpatient Medications   Medication Sig Dispense Refill    lisinopril (PRINIVIL;ZESTRIL) 20 MG tablet Take 1 tablet by mouth daily 90 tablet 3     No current facility-administered medications for this visit. No Known Allergies  Health Maintenance   Topic Date Due    Hepatitis C screen  Never done    Pneumococcal 0-64 years Vaccine (1 of 2 - PPSV23) Never done    COVID-19 Vaccine (1) Never done    HIV screen  Never done    Colon cancer screen colonoscopy  Never done    Flu vaccine (1) 09/01/2021    Potassium monitoring  06/15/2022    Creatinine monitoring  06/15/2022    A1C test (Diabetic or Prediabetic)  07/29/2022    Lipid screen  06/15/2026    DTaP/Tdap/Td vaccine (2 - Td or Tdap) 03/20/2028    Hepatitis A vaccine  Aged Out    Hepatitis B vaccine  Aged Out    Hib vaccine  Aged Out    Meningococcal (ACWY) vaccine  Aged Out         Objective:     Physical Exam  Vitals reviewed. Constitutional:       General: He is not in acute distress. HENT:      Mouth/Throat:      Pharynx: No oropharyngeal exudate. Eyes:      Conjunctiva/sclera: Conjunctivae normal.   Neck:      Thyroid: No thyromegaly. Comments: No carotid bruits  Cardiovascular:      Rate and Rhythm: Normal rate and regular rhythm. Heart sounds: Normal heart sounds. No murmur heard. Pulmonary:      Breath sounds: Normal breath sounds. No wheezing or rales. Abdominal:      General: Bowel sounds are normal. There is no distension. Palpations: Abdomen is soft. There is no mass. Tenderness: There is no abdominal tenderness. There is no guarding or rebound. Musculoskeletal:         General: No tenderness. Normal range of motion. Lymphadenopathy:      Cervical: No cervical adenopathy. Skin:     General: Skin is dry. Findings: No rash. Neurological:      Mental Status: He is alert and oriented to person, place, and time. Cranial Nerves: No cranial nerve deficit. Deep Tendon Reflexes: Reflexes are normal and symmetric. /78 (Site: Right Upper Arm)   Pulse 91   Temp 98.3 °F (36.8 °C) (Oral)   Resp 16   Ht 6' 2\" (1.88 m)   Wt 231 lb 8 oz (105 kg)   BMI 29.72 kg/m²       Impression/Plan:  1. Routine general medical examination at a health care facility    2. Essential hypertension    3. Hyperglycemia      Requested Prescriptions     Signed Prescriptions Disp Refills    lisinopril (PRINIVIL;ZESTRIL) 20 MG tablet 90 tablet 3     Sig: Take 1 tablet by mouth daily     Orders Placed This Encounter   Procedures    POCT glycosylated hemoglobin (Hb A1C)   Seen today for wellness visit. Discussed the importance of a healthy life style. Balanced diet, nutrition, physical activity,and injury prevention. Also discussed the importance of up to date immunizations and annual screenings. Patient giveneducational materials - see patient instructions. Discussed use, benefit, and side effects of prescribed medications. All patient questions answered. Pt voiced understanding. Reviewed health maintenance. Patient agreedwith treatment plan. Follow up as directed. **This report has been created using voice recognition software. It may contain minor errorswhich are inherent in voice recognition technology. **       Electronically signed by Nakita Acosta MD on 7/31/2021 at 9:08 AM

## 2022-03-25 ENCOUNTER — OFFICE VISIT (OUTPATIENT)
Dept: FAMILY MEDICINE CLINIC | Age: 52
End: 2022-03-25
Payer: COMMERCIAL

## 2022-03-25 VITALS
OXYGEN SATURATION: 93 % | DIASTOLIC BLOOD PRESSURE: 80 MMHG | BODY MASS INDEX: 28.64 KG/M2 | RESPIRATION RATE: 18 BRPM | WEIGHT: 223.1 LBS | HEART RATE: 74 BPM | TEMPERATURE: 98 F | SYSTOLIC BLOOD PRESSURE: 122 MMHG

## 2022-03-25 DIAGNOSIS — K40.90 LEFT INGUINAL HERNIA: Primary | ICD-10-CM

## 2022-03-25 DIAGNOSIS — Z12.5 SCREENING PSA (PROSTATE SPECIFIC ANTIGEN): ICD-10-CM

## 2022-03-25 DIAGNOSIS — L91.8 CUTANEOUS SKIN TAGS: ICD-10-CM

## 2022-03-25 DIAGNOSIS — Z00.00 ROUTINE GENERAL MEDICAL EXAMINATION AT A HEALTH CARE FACILITY: ICD-10-CM

## 2022-03-25 DIAGNOSIS — Z12.11 SCREEN FOR COLON CANCER: ICD-10-CM

## 2022-03-25 PROCEDURE — 99213 OFFICE O/P EST LOW 20 MIN: CPT | Performed by: FAMILY MEDICINE

## 2022-03-25 NOTE — PATIENT INSTRUCTIONS
THE MOST IMPORTANT ACTION YOU CAN TAKE TO IMPROVE YOUR CURRENT AND FUTURE HEALTH IS TO QUIT SMOKING. Call the [facility] NOW ([phone])    Smoking harms nonsmokers. When nonsmokers are around people who smoke, they absorb nicotine, carbon monoxide, and other ingredients of tobacco smoke. DO NOT SMOKE AROUND CHILDREN    Children exposed to secondhand smoke are at an increased risk of:  Sudden Infant Death Syndrome (SIDS), acute respiratory infections, inflammation of the middle ear, and severe asthma. Over a longer time, it causes heart disease and lung cancer. There is no safe level of exposure to secondhand smoke.

## 2022-03-26 ASSESSMENT — ENCOUNTER SYMPTOMS
NAUSEA: 0
SORE THROAT: 0
COUGH: 0
SHORTNESS OF BREATH: 0
RHINORRHEA: 0
SINUS PRESSURE: 0
EYE PAIN: 0
DIARRHEA: 0
ABDOMINAL PAIN: 0
ABDOMINAL DISTENTION: 0
CONSTIPATION: 0

## 2022-03-26 NOTE — PROGRESS NOTES
[address] [place] [address]  Dept: [phone]  Dept Fax: [phone]  Loc: [phone]  PROGRESS NOTE      VisitDate: 3/25/2022    Luz Maria Berry is a 50 y.o. male who presents today for:     Chief Complaint   Patient presents with    Hernia     left groin, unsure how long it's been there, has had previous hernia repair=unsure of which side, can push it back in, painful with coughing    Health Maintenance     due for colonoscopy-wife saw Demetrius [facility] Mole     check mole on left arm, right chest, left neck         Subjective:  HPI  Patient presents with several week history of left groin pain. He has been noticing it bulging out. He is able to push it back in but he is noticing increasing discomfort in this area especially when coughing. Health maintenance items reviewed. Will refer for screening colonoscopy. Has skin tags he like removed at some point. Review of Systems   Constitutional: Negative for appetite change and fever. HENT: Negative for congestion, ear pain, postnasal drip, rhinorrhea, sinus pressure and sore throat. Eyes: Negative for pain and visual disturbance. Respiratory: Negative for cough and shortness of breath. Cardiovascular: Negative for chest pain. Gastrointestinal: Negative for abdominal distention, abdominal pain, constipation, diarrhea and nausea. Genitourinary: Negative for dysuria, frequency and urgency. Musculoskeletal: Negative for arthralgias. Skin: Negative for rash. Neurological: Negative for dizziness.      Past Medical History:   Diagnosis Date    Hypertension       Past Surgical History:   Procedure Laterality Date    CYST REMOVAL  1989    Lt Wrist    HERNIA REPAIR  2008    SPINE SURGERY  03/2016    OIS - C4,5,6    VASECTOMY  2000     Family History   Problem Relation Age of Onset    High Blood Pressure Father     High Cholesterol Father     Heart Disease Father     Heart Disease Maternal Aunt     Cancer Maternal Grandfather         lung    Stroke Maternal Grandfather     Heart Disease Maternal Grandfather      Social History     Tobacco Use    Smoking status: Current Every Day Smoker     Packs/day: 1.00     Years: 15.00     Pack years: 15.00     Types: Cigarettes    Smokeless tobacco: Never Used    Tobacco comment: 10/18/19 down to 1ppd   Substance Use Topics    Alcohol use: Yes     Alcohol/week: 6.0 standard drinks     Types: 6 Cans of beer per week      Current Outpatient Medications   Medication Sig Dispense Refill    lisinopril (PRINIVIL;ZESTRIL) 20 MG tablet Take 1 tablet by mouth daily 90 tablet 3     No current facility-administered medications for this visit. No Known Allergies  Health Maintenance   Topic Date Due    Hepatitis C screen  Never done    COVID-19 Vaccine (1) Never done    Pneumococcal 0-64 years Vaccine (1 of 2 - PPSV23) Never done    HIV screen  Never done    Colorectal Cancer Screen  Never done    Flu vaccine (1) 09/01/2021    Shingles Vaccine (1 of 2) Never done    Potassium monitoring  06/15/2022    Creatinine monitoring  06/15/2022    A1C test (Diabetic or Prediabetic)  07/29/2022    Depression Screen  07/29/2022    Lipid screen  06/15/2026    DTaP/Tdap/Td vaccine (2 - Td or Tdap) 03/20/2028    Hepatitis A vaccine  Aged Out    Hepatitis B vaccine  Aged Out    Hib vaccine  Aged Out    Meningococcal (ACWY) vaccine  Aged Out         Objective:     Physical Exam  Constitutional:       General: He is not in acute distress. Appearance: He is well-developed. He is not diaphoretic. HENT:      Head: Normocephalic and atraumatic. Right Ear: External ear normal.      Left Ear: External ear normal.   Eyes:      Conjunctiva/sclera: Conjunctivae normal.   Neck:      Vascular: No JVD. Cardiovascular:      Rate and Rhythm: Normal rate and regular rhythm. Heart sounds: Normal heart sounds.    Pulmonary: Effort: Pulmonary effort is normal.      Breath sounds: Normal breath sounds. No wheezing or rales. Abdominal:      Comments: Left inguinal hernia without strangulation reporting normal overlying tenderness no skin changes   Musculoskeletal:         General: No tenderness. Skin:     General: Skin is warm and dry. Coloration: Skin is not pale. Neurological:      Mental Status: He is alert and oriented to person, place, and time. /80 (Site: Right Upper Arm)   Pulse 74   Temp 98 °F (36.7 °C) (Oral)   Resp 18   Wt 223 lb 1.6 oz (101.2 kg)   SpO2 93%   BMI 28.64 kg/m²       Impression/Plan:  1. Left inguinal hernia    2. Screen for colon cancer    3. Cutaneous skin tags    4. Routine general medical examination at a health care facility    5.  Screening PSA (prostate specific antigen)      Requested Prescriptions      No prescriptions requested or ordered in this encounter     Orders Placed This Encounter   Procedures    Lipid Panel     Standing Status:   Future     Standing Expiration Date:   3/25/2023     Order Specific Question:   Is Patient Fasting?/# of Hours     Answer:   yes/12    PSA Screening     Standing Status:   Future     Standing Expiration Date:   3/25/2023    Comprehensive Metabolic Panel     Standing Status:   Future     Standing Expiration Date:   3/25/2023    CBC with Auto Differential     Standing Status:   Future     Standing Expiration Date:   3/25/2023   Angelina Fabian MD, [facility]enter[facility], [facility]     Referral Priority:   Routine     Referral Type:   Eval and Treat     Referral Reason:   Specialty Services Required     Referred to Provider:   Jodee Rodriguez MD     Requested Specialty:   Gastroenterology     Number of Visits Requested:   1    Ambulatory referral to General Surgery     Referral Priority:   Routine     Referral Type:   Consult for Advice and Opinion     Referral Reason:   Specialty Services Required     Requested Specialty:   General Surgery Number of Visits Requested:   1       Patient giveneducational materials - see patient instructions. Discussed use, benefit, and side effects of prescribed medications. All patient questions answered. Pt voiced understanding. Reviewed health maintenance. Patient agreedwith treatment plan. Follow up as directed. **This report has been created using voice recognition software. It may contain minor errorswhich are inherent in voice recognition technology. **       Electronically signed by Eric Mark MD on 3/26/2022 at 7:14 AM

## 2022-04-01 ENCOUNTER — OFFICE VISIT (OUTPATIENT)
Dept: SURGERY | Age: 52
End: 2022-04-01
Payer: COMMERCIAL

## 2022-04-01 VITALS
DIASTOLIC BLOOD PRESSURE: 72 MMHG | RESPIRATION RATE: 14 BRPM | HEART RATE: 82 BPM | SYSTOLIC BLOOD PRESSURE: 122 MMHG | OXYGEN SATURATION: 98 % | BODY MASS INDEX: 28.52 KG/M2 | TEMPERATURE: 97.2 F | HEIGHT: 74 IN | WEIGHT: 222.2 LBS

## 2022-04-01 DIAGNOSIS — I10 ESSENTIAL HYPERTENSION: ICD-10-CM

## 2022-04-01 DIAGNOSIS — Z01.818 PRE-OP TESTING: ICD-10-CM

## 2022-04-01 DIAGNOSIS — K40.90 INGUINAL HERNIA, LEFT: Primary | ICD-10-CM

## 2022-04-01 PROCEDURE — 99204 OFFICE O/P NEW MOD 45 MIN: CPT | Performed by: SURGERY

## 2022-04-01 ASSESSMENT — ENCOUNTER SYMPTOMS
VOICE CHANGE: 0
SINUS PAIN: 0
STRIDOR: 0
TROUBLE SWALLOWING: 0
EYE REDNESS: 0
ANAL BLEEDING: 0
RHINORRHEA: 0
ABDOMINAL PAIN: 1
DIARRHEA: 0
WHEEZING: 0
APNEA: 0
FACIAL SWELLING: 0
SHORTNESS OF BREATH: 0
CONSTIPATION: 0
CHEST TIGHTNESS: 0
NAUSEA: 0
COLOR CHANGE: 0
SORE THROAT: 0
VOMITING: 0
ABDOMINAL DISTENTION: 0
PHOTOPHOBIA: 0
SINUS PRESSURE: 0
EYE ITCHING: 0
BLOOD IN STOOL: 0
COUGH: 0
BACK PAIN: 0
EYE PAIN: 0
RECTAL PAIN: 0
CHOKING: 0
EYE DISCHARGE: 0

## 2022-04-01 NOTE — LETTER
Humphrey Tamayo DO  [address] [place] [address]  [phone]     Pt Name: Suzy Bush  Medical Record Number: [id number]  Date of Birth 1970   Today's Date: 4/1/2022    Perla Schwartz was seen in consultation in the office today. My assessment and plans are listed below. ASSESSMENT      Diagnosis Orders   1. Inguinal hernia, left     2. Essential hypertension     3. Pre-op testing       Past Medical History:   Diagnosis Date    Hypertension          PLANS:   1. Schedule Los for left inguinal hernia repair with mesh  2. I had a discussion with the patient regarding potential therapies for hernias and the risks of hernia surgery to include bleeding, infection, recurrence, injury to surrounding structures and organs, and continued pain in the area. We also discussed the use of mesh and its advantages and disadvantages. We discussed the anesthetic options, conduct of the operation, outpatient status, post op recovery and length of time off of work. After this discussion, the patient's questions were answered and has elected to proceed with surgical repair. 3. Status: outpatient  4. Planned anesthesia: general  5. He will undergo pre-operative clearance per anesthesia guidelines with risk factors listed under the past medical history diagnosis & problem list.    If I can provide any additional assistance or you have any concerns, please feel free to contact me. Thank you for allowing to participate in the care of your patients. Sincerely,      Rico Maguire.  Anton Craft

## 2022-04-01 NOTE — PROGRESS NOTES
Apolinar Warren. [facility], [address] [place] [address]  [phone]  New Patient Evaluation in Office    Pt Name: Carolyn Valencia  Date of Birth 1970   Today's Date: 4/1/2022  Medical Record Number: [id number]  Referring Provider: Lindsey Saunders MD  Primary Care Provider: Madan Funez MD  Chief Complaint   Patient presents with   Demetrius Mendiola Surgical Consult     New patient-referred by Dr Shana Saunders inguinal hernia     ASSESSMENT       Diagnosis Orders   1. Inguinal hernia, left     2. Essential hypertension     3. Pre-op testing       Past Medical History:   Diagnosis Date    Hypertension           PLANS      1. Schedule Los for left inguinal hernia repair with mesh  2. I had a discussion with the patient regarding potential therapies for hernias and the risks of hernia surgery to include bleeding, infection, recurrence, injury to surrounding structures and organs, and continued pain in the area. We also discussed the use of mesh and its advantages and disadvantages. We discussed the anesthetic options, conduct of the operation, outpatient status, post op recovery and length of time off of work. After this discussion, the patient's questions were answered and has elected to proceed with surgical repair. 3. Status: outpatient  4. Planned anesthesia: general  5. He will undergo pre-operative clearance per anesthesia guidelines with risk factors listed under the past medical history diagnosis & problem list.     Ratna Resendiz is a 50 y.o. male seen in the consultation for evaluation of a left inguinal hernia. Symptoms were first noted 3 months ago and has gradually worsened since that time. The pain is dull, intermittent, mild in severity. It is aggravated by Valsalva maneuvers and palliated by rest. He has no additional symptoms. He denies signs or symptoms of incarceration or strangulation. He has had prior right inguinal hernia surgery. This hernia is not stated to be work related per patient. Past Medical History  Past Medical History:   Diagnosis Date    Hypertension      Past Surgical History  Past Surgical History:   Procedure Laterality Date    CYST REMOVAL  1989    Lt Wrist    [place]    [facility]    SPINE SURGERY  03/2016    OIS - C4,5,6    VASECTOMY  2000     Medications  Current Outpatient Medications   Medication Sig Dispense Refill    lisinopril (PRINIVIL;ZESTRIL) 20 MG tablet Take 1 tablet by mouth daily 90 tablet 3     No current facility-administered medications for this visit. Allergies  has No Known Allergies. Family History  family history includes Cancer in his maternal grandfather; Heart Disease in his father, maternal aunt, and maternal grandfather; High Blood Pressure in his father; High Cholesterol in his father; Stroke in his maternal grandfather. Social History   reports that he has been smoking cigarettes. He has a 15.00 pack-year smoking history. He has never used smokeless tobacco. He reports current alcohol use of about 6.0 standard drinks of alcohol per week. He reports that he does not use drugs.   Health Screening Exams  Health Maintenance   Topic Date Due    Hepatitis C screen  Never done    COVID-19 Vaccine (1) Never done    Pneumococcal 0-64 years Vaccine (1 of 2 - PPSV23) Never done    HIV screen  Never done    Colorectal Cancer Screen  Never done    Shingles Vaccine (1 of 2) Never done    Potassium monitoring  06/15/2022    Creatinine monitoring  06/15/2022    A1C test (Diabetic or Prediabetic)  07/29/2022    Depression Screen  07/29/2022    Flu vaccine (Season Ended) 09/01/2022    Lipid screen  06/15/2026    DTaP/Tdap/Td vaccine (2 - Td or Tdap) 03/20/2028    Hepatitis A vaccine  Aged Out    Hepatitis B vaccine  Aged Out    Hib vaccine  Aged Out    Meningococcal (ACWY) vaccine  Aged Out     Review of Systems  Constitutional: Negative for activity change, appetite change, chills, diaphoresis, fatigue, fever and unexpected weight change. HENT: Negative for congestion, dental problem, drooling, ear discharge, ear pain, facial swelling, hearing loss, mouth sores, nosebleeds, postnasal drip, rhinorrhea, sinus pressure, sinus pain, sneezing, sore throat, tinnitus, trouble swallowing and voice change. Eyes: Negative for photophobia, pain, discharge, redness, itching and visual disturbance. Respiratory: Negative for apnea, cough, choking, chest tightness, shortness of breath, wheezing and stridor. Cardiovascular: Negative for chest pain, palpitations and leg swelling. Gastrointestinal: Positive for abdominal pain (intermittent left groin). Negative for abdominal distention, anal bleeding, blood in stool, constipation, diarrhea, nausea, rectal pain and vomiting. Endocrine: Negative for cold intolerance, heat intolerance, polydipsia, polyphagia and polyuria. Genitourinary: Negative for decreased urine volume, difficulty urinating, dysuria, enuresis, flank pain, frequency, genital sores, hematuria, penile discharge, penile pain, penile swelling, scrotal swelling, testicular pain and urgency. Musculoskeletal: Negative for arthralgias, back pain, gait problem, joint swelling, myalgias, neck pain and neck stiffness. Skin: Negative for color change, pallor, rash and wound. Allergic/Immunologic: Negative for environmental allergies, food allergies and immunocompromised state. Neurological: Negative for dizziness, tremors, seizures, syncope, facial asymmetry, speech difficulty, weakness, light-headedness, numbness and headaches. Hematological: Negative for adenopathy. Does not bruise/bleed easily. Psychiatric/Behavioral: Negative for agitation, behavioral problems, confusion, decreased concentration, dysphoric mood, hallucinations, self-injury, sleep disturbance and suicidal ideas. The patient is not nervous/anxious and is not hyperactive.       OBJECTIVE VITALS:  height is 6' 2\" (1.88 m) and weight is 222 lb 3.2 oz (100.8 kg). His tympanic temperature is 97.2 °F (36.2 °C). His blood pressure is 122/72 and his pulse is 82. His respiration is 14 and oxygen saturation is 98%. Pain Score:   2 Body mass index is 28.53 kg/m². CONSTITUTIONAL: Alert and oriented times 3, no acute distress and cooperative to examination with proper mood and affect. SKIN: Skin color, texture, turgor normal. No rashes or lesions. LYMPH: no cervical nodes, no inguinal nodes  HEENT: Head is normocephalic, atraumatic. EOMI, PERRLA. NECK: Supple, symmetrical, trachea midline, no adenopathy, thyroid symmetric, not enlarged and no tenderness, skin normal.  CHEST/LUNGS: chest symmetric with normal A/P diameter, normal respiratory rate and rhythm, lungs clear to auscultation without wheezes, rales or rhonchi. No accessory muscle use. Scars None   CARDIOVASCULAR: Heart sounds are normal.  Regular rate and rhythm without murmur, gallop or rub. Normal S1 and S2. Carotid and femoral pulses 2+/4 and equal bilaterally. ABDOMEN: Normal shape. Laparoscopic scar(s) present. Normal bowel sounds. No bruits. soft, nontender, nondistended, no masses or organomegaly. left inguinal hernia present which is reducible. Percussion: Normal without hepatosplenomegally. RECTAL: deferred, not clinically indicated  NEUROLOGIC: There are no focalizing motor or sensory deficits. CN II-XII are grossly intact. Heddy  EXTREMITIES: no cyanosis, no clubbing and no edema. Thank you for the interesting evaluation. Further recommendations as listed above.        Electronically signed by Luz Maria Anna DO on 4/1/2022 at 10:17 AM

## 2022-04-01 NOTE — PROGRESS NOTES
Subjective:      Patient ID: Apple Dugan is a 50 y.o. male. Chief Complaint   Patient presents with    Surgical Consult     New patient-referred by Dr Nazario Almaraz inguinal hernia       HPI    Review of Systems   Constitutional: Negative for activity change, appetite change, chills, diaphoresis, fatigue, fever and unexpected weight change. HENT: Negative for congestion, dental problem, drooling, ear discharge, ear pain, facial swelling, hearing loss, mouth sores, nosebleeds, postnasal drip, rhinorrhea, sinus pressure, sinus pain, sneezing, sore throat, tinnitus, trouble swallowing and voice change. Eyes: Negative for photophobia, pain, discharge, redness, itching and visual disturbance. Respiratory: Negative for apnea, cough, choking, chest tightness, shortness of breath, wheezing and stridor. Cardiovascular: Negative for chest pain, palpitations and leg swelling. Gastrointestinal: Positive for abdominal pain (intermittent left groin). Negative for abdominal distention, anal bleeding, blood in stool, constipation, diarrhea, nausea, rectal pain and vomiting. Endocrine: Negative for cold intolerance, heat intolerance, polydipsia, polyphagia and polyuria. Genitourinary: Negative for decreased urine volume, difficulty urinating, dysuria, enuresis, flank pain, frequency, genital sores, hematuria, penile discharge, penile pain, penile swelling, scrotal swelling, testicular pain and urgency. Musculoskeletal: Negative for arthralgias, back pain, gait problem, joint swelling, myalgias, neck pain and neck stiffness. Skin: Negative for color change, pallor, rash and wound. Allergic/Immunologic: Negative for environmental allergies, food allergies and immunocompromised state. Neurological: Negative for dizziness, tremors, seizures, syncope, facial asymmetry, speech difficulty, weakness, light-headedness, numbness and headaches. Hematological: Negative for adenopathy.  Does not bruise/bleed easily. Psychiatric/Behavioral: Negative for agitation, behavioral problems, confusion, decreased concentration, dysphoric mood, hallucinations, self-injury, sleep disturbance and suicidal ideas. The patient is not nervous/anxious and is not hyperactive.       /72 (Site: Left Upper Arm, Position: Sitting, Cuff Size: Medium Adult)   Pulse 82   Temp 97.2 °F (36.2 °C) (Tympanic)   Resp 14   Ht 6' 2\" (1.88 m)   Wt 222 lb 3.2 oz (100.8 kg)   SpO2 98%   BMI 28.53 kg/m²     Objective:   Physical Exam    Assessment:            Plan:              Goran Silva

## 2022-04-04 ENCOUNTER — PROCEDURE VISIT (OUTPATIENT)
Dept: FAMILY MEDICINE CLINIC | Age: 52
End: 2022-04-04
Payer: COMMERCIAL

## 2022-04-04 VITALS
OXYGEN SATURATION: 97 % | SYSTOLIC BLOOD PRESSURE: 144 MMHG | DIASTOLIC BLOOD PRESSURE: 84 MMHG | WEIGHT: 227 LBS | HEART RATE: 73 BPM | BODY MASS INDEX: 29.15 KG/M2 | TEMPERATURE: 98.3 F | RESPIRATION RATE: 18 BRPM

## 2022-04-04 DIAGNOSIS — L91.8 INFLAMED SKIN TAG: Primary | ICD-10-CM

## 2022-04-04 PROCEDURE — 11200 RMVL SKIN TAGS UP TO&INC 15: CPT | Performed by: FAMILY MEDICINE

## 2022-04-04 NOTE — PROGRESS NOTES
Chief Complaint   Patient presents with    Skin Tag     skin tag removal      Diagnosis Orders   1. Inflamed skin tag  07806 - ID REMOVAL OF SKIN TAGS, UP TO 15     Orders Placed This Encounter   Procedures    47843 - ID REMOVAL OF SKIN TAGS, UP TO 15     Patient presents for skin tag removal.  He has 3 separate skin tags one on the left humerus axillary area, left lateral neck and right lower chest.  All 3 are rubbing causing pain and inflammation. Procedure for removal was discussed with the patient informed consent was obtained. Area surrounding skin tags were swabbed with alcohol. 0.5 mL of 1% lidocaine with epinephrine were injected in the left lateral neck and right anterior chest wall. With adequate anesthesia demonstrated the skin tags were elevated with forceps and removed by shave excision with a 10 blade scalpel. Sterile bandage was applied. The skin tag in the left axilla was cleansed with alcohol elevated with forceps and removed with scissors. Sterile bandage applied. Care instructions provided the patient. Instructed to call if signs symptoms of infection increasing redness or pain.

## 2022-06-10 ENCOUNTER — NURSE ONLY (OUTPATIENT)
Dept: LAB | Age: 52
End: 2022-06-10

## 2022-06-10 ENCOUNTER — HOSPITAL ENCOUNTER (OUTPATIENT)
Age: 52
Discharge: HOME | End: 2022-06-10
Payer: COMMERCIAL

## 2022-06-10 DIAGNOSIS — K40.90 INGUINAL HERNIA, LEFT: ICD-10-CM

## 2022-06-10 DIAGNOSIS — Z01.818 PRE-OP TESTING: ICD-10-CM

## 2022-06-10 DIAGNOSIS — Z12.5 SCREENING PSA (PROSTATE SPECIFIC ANTIGEN): ICD-10-CM

## 2022-06-10 DIAGNOSIS — I10 ESSENTIAL HYPERTENSION: ICD-10-CM

## 2022-06-10 DIAGNOSIS — Z00.00 ROUTINE GENERAL MEDICAL EXAMINATION AT A HEALTH CARE FACILITY: ICD-10-CM

## 2022-06-10 LAB
ALBUMIN SERPL-MCNC: 5.1 G/DL (ref 3.5–5.1)
ALP BLD-CCNC: 70 U/L (ref 38–126)
ALT SERPL-CCNC: 32 U/L (ref 11–66)
ANION GAP SERPL CALCULATED.3IONS-SCNC: 8 MEQ/L (ref 8–16)
AST SERPL-CCNC: 23 U/L (ref 5–40)
BASOPHILS # BLD: 0.7 %
BASOPHILS ABSOLUTE: 0.1 THOU/MM3 (ref 0–0.1)
BILIRUB SERPL-MCNC: 0.6 MG/DL (ref 0.3–1.2)
BUN BLDV-MCNC: 12 MG/DL (ref 7–22)
CALCIUM SERPL-MCNC: 9.6 MG/DL (ref 8.5–10.5)
CHLORIDE BLD-SCNC: 101 MEQ/L (ref 98–111)
CHOLESTEROL, TOTAL: 197 MG/DL (ref 100–199)
CO2: 28 MEQ/L (ref 23–33)
CREAT SERPL-MCNC: 0.9 MG/DL (ref 0.4–1.2)
EKG ATRIAL RATE: 63 BPM
EKG P AXIS: 19 DEGREES
EKG P-R INTERVAL: 112 MS
EKG Q-T INTERVAL: 392 MS
EKG QRS DURATION: 100 MS
EKG QTC CALCULATION (BAZETT): 401 MS
EKG R AXIS: 1 DEGREES
EKG T AXIS: 56 DEGREES
EKG VENTRICULAR RATE: 63 BPM
EOSINOPHIL # BLD: 2.1 %
EOSINOPHILS ABSOLUTE: 0.2 THOU/MM3 (ref 0–0.4)
ERYTHROCYTE [DISTWIDTH] IN BLOOD BY AUTOMATED COUNT: 12.7 % (ref 11.5–14.5)
ERYTHROCYTE [DISTWIDTH] IN BLOOD BY AUTOMATED COUNT: 45.3 FL (ref 35–45)
GFR SERPL CREATININE-BSD FRML MDRD: 89 ML/MIN/1.73M2
GLUCOSE BLD-MCNC: 116 MG/DL (ref 70–108)
HCT VFR BLD CALC: 51.5 % (ref 42–52)
HDLC SERPL-MCNC: 44 MG/DL
HEMOGLOBIN: 16.8 GM/DL (ref 14–18)
IMMATURE GRANS (ABS): 0.01 THOU/MM3 (ref 0–0.07)
IMMATURE GRANULOCYTES: 0.1 %
LDL CHOLESTEROL CALCULATED: 136 MG/DL
LYMPHOCYTES # BLD: 27.9 %
LYMPHOCYTES ABSOLUTE: 2 THOU/MM3 (ref 1–4.8)
MCH RBC QN AUTO: 31.7 PG (ref 26–33)
MCHC RBC AUTO-ENTMCNC: 32.6 GM/DL (ref 32.2–35.5)
MCV RBC AUTO: 97.2 FL (ref 80–94)
MONOCYTES # BLD: 7.4 %
MONOCYTES ABSOLUTE: 0.5 THOU/MM3 (ref 0.4–1.3)
NUCLEATED RED BLOOD CELLS: 0 /100 WBC
PLATELET # BLD: 261 THOU/MM3 (ref 130–400)
PMV BLD AUTO: 9.8 FL (ref 9.4–12.4)
POTASSIUM SERPL-SCNC: 5.1 MEQ/L (ref 3.5–5.2)
PROSTATE SPECIFIC ANTIGEN: 0.35 NG/ML (ref 0–1)
RBC # BLD: 5.3 MILL/MM3 (ref 4.7–6.1)
SEG NEUTROPHILS: 61.8 %
SEGMENTED NEUTROPHILS ABSOLUTE COUNT: 4.4 THOU/MM3 (ref 1.8–7.7)
SODIUM BLD-SCNC: 137 MEQ/L (ref 135–145)
TOTAL PROTEIN: 7.1 G/DL (ref 6.1–8)
TRIGL SERPL-MCNC: 85 MG/DL (ref 0–199)
WBC # BLD: 7.2 THOU/MM3 (ref 4.8–10.8)

## 2022-06-10 PROCEDURE — 93005 ELECTROCARDIOGRAM TRACING: CPT

## 2022-06-29 ENCOUNTER — TELEPHONE (OUTPATIENT)
Dept: SURGERY | Age: 52
End: 2022-06-29

## 2022-06-29 NOTE — TELEPHONE ENCOUNTER
PA Approved. [id number]   Pt thought surgery was tomorrow 6/30 instead of today, 6/29 and no showed. Pt calling in to r/s his surgery, apologized he got confused with his days.     Surgery r/s'd to 7/7/22, arrive at 7 am.  He voiced understanding

## 2022-07-07 ENCOUNTER — ANESTHESIA EVENT (OUTPATIENT)
Dept: OPERATING ROOM | Age: 52
End: 2022-07-07
Payer: COMMERCIAL

## 2022-07-07 ENCOUNTER — HOSPITAL ENCOUNTER (OUTPATIENT)
Age: 52
Setting detail: OUTPATIENT SURGERY
Discharge: HOME | End: 2022-07-07
Attending: SURGERY | Admitting: SURGERY
Payer: COMMERCIAL

## 2022-07-07 ENCOUNTER — ANESTHESIA (OUTPATIENT)
Dept: OPERATING ROOM | Age: 52
End: 2022-07-07
Payer: COMMERCIAL

## 2022-07-07 VITALS
BODY MASS INDEX: 27.54 KG/M2 | SYSTOLIC BLOOD PRESSURE: 107 MMHG | RESPIRATION RATE: 16 BRPM | HEART RATE: 68 BPM | WEIGHT: 214.6 LBS | OXYGEN SATURATION: 97 % | HEIGHT: 74 IN | DIASTOLIC BLOOD PRESSURE: 69 MMHG | TEMPERATURE: 96.2 F

## 2022-07-07 DIAGNOSIS — G89.18 ACUTE POSTOPERATIVE PAIN: Primary | ICD-10-CM

## 2022-07-07 PROCEDURE — 2580000003 HC RX 258: Performed by: SURGERY

## 2022-07-07 PROCEDURE — 3700000001 HC ADD 15 MINUTES (ANESTHESIA): Performed by: SURGERY

## 2022-07-07 PROCEDURE — 7100000001 HC PACU RECOVERY - ADDTL 15 MIN: Performed by: SURGERY

## 2022-07-07 PROCEDURE — 6360000002 HC RX W HCPCS: Performed by: NURSE ANESTHETIST, CERTIFIED REGISTERED

## 2022-07-07 PROCEDURE — 7100000011 HC PHASE II RECOVERY - ADDTL 15 MIN: Performed by: SURGERY

## 2022-07-07 PROCEDURE — 3700000000 HC ANESTHESIA ATTENDED CARE: Performed by: SURGERY

## 2022-07-07 PROCEDURE — 6370000000 HC RX 637 (ALT 250 FOR IP): Performed by: SURGERY

## 2022-07-07 PROCEDURE — 2500000003 HC RX 250 WO HCPCS: Performed by: SURGERY

## 2022-07-07 PROCEDURE — 3600000002 HC SURGERY LEVEL 2 BASE: Performed by: SURGERY

## 2022-07-07 PROCEDURE — 6360000002 HC RX W HCPCS: Performed by: SURGERY

## 2022-07-07 PROCEDURE — 7100000010 HC PHASE II RECOVERY - FIRST 15 MIN: Performed by: SURGERY

## 2022-07-07 PROCEDURE — C1781 MESH (IMPLANTABLE): HCPCS | Performed by: SURGERY

## 2022-07-07 PROCEDURE — 2500000003 HC RX 250 WO HCPCS: Performed by: NURSE ANESTHETIST, CERTIFIED REGISTERED

## 2022-07-07 PROCEDURE — 7100000000 HC PACU RECOVERY - FIRST 15 MIN: Performed by: SURGERY

## 2022-07-07 PROCEDURE — 49505 PRP I/HERN INIT REDUC >5 YR: CPT | Performed by: SURGERY

## 2022-07-07 PROCEDURE — 3600000012 HC SURGERY LEVEL 2 ADDTL 15MIN: Performed by: SURGERY

## 2022-07-07 PROCEDURE — 2709999900 HC NON-CHARGEABLE SUPPLY: Performed by: SURGERY

## 2022-07-07 DEVICE — MESH HERN W1.8XL4IN INGUINAL POLYPR PRESHAPED SPERMATIC CRD: Type: IMPLANTABLE DEVICE | Site: GROIN | Status: FUNCTIONAL

## 2022-07-07 RX ORDER — SODIUM CHLORIDE 9 MG/ML
INJECTION, SOLUTION INTRAVENOUS PRN
Status: DISCONTINUED | OUTPATIENT
Start: 2022-07-07 | End: 2022-07-07 | Stop reason: HOSPADM

## 2022-07-07 RX ORDER — ONDANSETRON 2 MG/ML
4 INJECTION INTRAMUSCULAR; INTRAVENOUS
Status: DISCONTINUED | OUTPATIENT
Start: 2022-07-07 | End: 2022-07-07 | Stop reason: HOSPADM

## 2022-07-07 RX ORDER — SODIUM CHLORIDE 0.9 % (FLUSH) 0.9 %
5-40 SYRINGE (ML) INJECTION EVERY 12 HOURS SCHEDULED
Status: DISCONTINUED | OUTPATIENT
Start: 2022-07-07 | End: 2022-07-07 | Stop reason: HOSPADM

## 2022-07-07 RX ORDER — ONDANSETRON 2 MG/ML
INJECTION INTRAMUSCULAR; INTRAVENOUS PRN
Status: DISCONTINUED | OUTPATIENT
Start: 2022-07-07 | End: 2022-07-07 | Stop reason: SDUPTHER

## 2022-07-07 RX ORDER — BUPIVACAINE HYDROCHLORIDE AND EPINEPHRINE 5; 5 MG/ML; UG/ML
INJECTION, SOLUTION EPIDURAL; INTRACAUDAL; PERINEURAL PRN
Status: DISCONTINUED | OUTPATIENT
Start: 2022-07-07 | End: 2022-07-07 | Stop reason: ALTCHOICE

## 2022-07-07 RX ORDER — LABETALOL 20 MG/4 ML (5 MG/ML) INTRAVENOUS SYRINGE
10
Status: DISCONTINUED | OUTPATIENT
Start: 2022-07-07 | End: 2022-07-07 | Stop reason: HOSPADM

## 2022-07-07 RX ORDER — HYDROCODONE BITARTRATE AND ACETAMINOPHEN 5; 325 MG/1; MG/1
1 TABLET ORAL EVERY 4 HOURS PRN
Status: DISCONTINUED | OUTPATIENT
Start: 2022-07-07 | End: 2022-07-07 | Stop reason: HOSPADM

## 2022-07-07 RX ORDER — HYDROMORPHONE HCL 110MG/55ML
PATIENT CONTROLLED ANALGESIA SYRINGE INTRAVENOUS PRN
Status: DISCONTINUED | OUTPATIENT
Start: 2022-07-07 | End: 2022-07-07 | Stop reason: SDUPTHER

## 2022-07-07 RX ORDER — MIDAZOLAM HYDROCHLORIDE 1 MG/ML
INJECTION INTRAMUSCULAR; INTRAVENOUS PRN
Status: DISCONTINUED | OUTPATIENT
Start: 2022-07-07 | End: 2022-07-07 | Stop reason: SDUPTHER

## 2022-07-07 RX ORDER — HYDRALAZINE HYDROCHLORIDE 20 MG/ML
10 INJECTION INTRAMUSCULAR; INTRAVENOUS
Status: DISCONTINUED | OUTPATIENT
Start: 2022-07-07 | End: 2022-07-07 | Stop reason: HOSPADM

## 2022-07-07 RX ORDER — SODIUM CHLORIDE 9 MG/ML
INJECTION, SOLUTION INTRAVENOUS CONTINUOUS
Status: DISCONTINUED | OUTPATIENT
Start: 2022-07-07 | End: 2022-07-07 | Stop reason: HOSPADM

## 2022-07-07 RX ORDER — SODIUM CHLORIDE 0.9 % (FLUSH) 0.9 %
5-40 SYRINGE (ML) INJECTION PRN
Status: DISCONTINUED | OUTPATIENT
Start: 2022-07-07 | End: 2022-07-07 | Stop reason: HOSPADM

## 2022-07-07 RX ORDER — HYDROCODONE BITARTRATE AND ACETAMINOPHEN 5; 325 MG/1; MG/1
1 TABLET ORAL EVERY 4 HOURS PRN
Qty: 30 TABLET | Refills: 0 | Status: SHIPPED | OUTPATIENT
Start: 2022-07-07 | End: 2022-07-12

## 2022-07-07 RX ORDER — PROPOFOL 10 MG/ML
INJECTION, EMULSION INTRAVENOUS PRN
Status: DISCONTINUED | OUTPATIENT
Start: 2022-07-07 | End: 2022-07-07 | Stop reason: SDUPTHER

## 2022-07-07 RX ORDER — DEXAMETHASONE SODIUM PHOSPHATE 10 MG/ML
INJECTION, EMULSION INTRAMUSCULAR; INTRAVENOUS PRN
Status: DISCONTINUED | OUTPATIENT
Start: 2022-07-07 | End: 2022-07-07 | Stop reason: SDUPTHER

## 2022-07-07 RX ORDER — ONDANSETRON 2 MG/ML
4 INJECTION INTRAMUSCULAR; INTRAVENOUS EVERY 6 HOURS PRN
Status: DISCONTINUED | OUTPATIENT
Start: 2022-07-07 | End: 2022-07-07 | Stop reason: HOSPADM

## 2022-07-07 RX ORDER — ROCURONIUM BROMIDE 10 MG/ML
INJECTION, SOLUTION INTRAVENOUS PRN
Status: DISCONTINUED | OUTPATIENT
Start: 2022-07-07 | End: 2022-07-07 | Stop reason: SDUPTHER

## 2022-07-07 RX ORDER — LIDOCAINE HYDROCHLORIDE 20 MG/ML
INJECTION, SOLUTION INTRAVENOUS PRN
Status: DISCONTINUED | OUTPATIENT
Start: 2022-07-07 | End: 2022-07-07 | Stop reason: SDUPTHER

## 2022-07-07 RX ORDER — FENTANYL CITRATE 50 UG/ML
INJECTION, SOLUTION INTRAMUSCULAR; INTRAVENOUS PRN
Status: DISCONTINUED | OUTPATIENT
Start: 2022-07-07 | End: 2022-07-07 | Stop reason: SDUPTHER

## 2022-07-07 RX ADMIN — CEFAZOLIN 2000 MG: 10 INJECTION, POWDER, FOR SOLUTION INTRAVENOUS at 08:50

## 2022-07-07 RX ADMIN — FENTANYL CITRATE 100 MCG: 50 INJECTION, SOLUTION INTRAMUSCULAR; INTRAVENOUS at 08:43

## 2022-07-07 RX ADMIN — HYDROCODONE BITARTRATE AND ACETAMINOPHEN 1 TABLET: 5; 325 TABLET ORAL at 10:07

## 2022-07-07 RX ADMIN — DEXAMETHASONE SODIUM PHOSPHATE 5 MG: 10 INJECTION, EMULSION INTRAMUSCULAR; INTRAVENOUS at 08:44

## 2022-07-07 RX ADMIN — ONDANSETRON 4 MG: 2 INJECTION INTRAMUSCULAR; INTRAVENOUS at 09:00

## 2022-07-07 RX ADMIN — HYDROMORPHONE HYDROCHLORIDE 0.5 MG: 2 INJECTION INTRAMUSCULAR; INTRAVENOUS; SUBCUTANEOUS at 09:02

## 2022-07-07 RX ADMIN — SUGAMMADEX 200 MG: 100 INJECTION, SOLUTION INTRAVENOUS at 09:16

## 2022-07-07 RX ADMIN — SODIUM CHLORIDE: 9 INJECTION, SOLUTION INTRAVENOUS at 08:00

## 2022-07-07 RX ADMIN — PROPOFOL 150 MG: 10 INJECTION, EMULSION INTRAVENOUS at 08:43

## 2022-07-07 RX ADMIN — ROCURONIUM BROMIDE 50 MG: 50 INJECTION, SOLUTION INTRAVENOUS at 08:43

## 2022-07-07 RX ADMIN — LIDOCAINE HYDROCHLORIDE 100 MG: 20 INJECTION, SOLUTION INTRAVENOUS at 08:43

## 2022-07-07 RX ADMIN — MIDAZOLAM 2 MG: 1 INJECTION INTRAMUSCULAR; INTRAVENOUS at 08:38

## 2022-07-07 RX ADMIN — PROPOFOL 50 MG: 10 INJECTION, EMULSION INTRAVENOUS at 09:10

## 2022-07-07 ASSESSMENT — PAIN SCALES - GENERAL
PAINLEVEL_OUTOF10: 5
PAINLEVEL_OUTOF10: 4
PAINLEVEL_OUTOF10: 5
PAINLEVEL_OUTOF10: 0

## 2022-07-07 ASSESSMENT — PAIN DESCRIPTION - LOCATION
LOCATION: ABDOMEN
LOCATION: ABDOMEN

## 2022-07-07 NOTE — ANESTHESIA POSTPROCEDURE EVALUATION
Department of Anesthesiology  Postprocedure Note    Patient: Katharine Villarreal  MRN: [id number]  YOB: 1970  Date of evaluation: 7/7/2022      Procedure Summary     Date: 07/07/22 Room / Location: [address] ANG[facility]A [place]    Anesthesia Start: 0838 Anesthesia Stop: 7203    Procedure: Left Inguinal Hernia Repair with Mesh (Left Groin) Diagnosis:       Left inguinal hernia      (Left inguinal hernia [K40.90])    Surgeons: Constanza Garcia DO Responsible Provider: Ramsey Canada MD    Anesthesia Type: general ASA Status: 2          Anesthesia Type: No value filed.     Lisa Phase I: Lisa Score: 10    Lisa Phase II: Lisa Score: 10      Anesthesia Post Evaluation    Patient location during evaluation: PACU  Patient participation: complete - patient participated  Level of consciousness: awake and alert  Airway patency: patent  Nausea & Vomiting: no nausea  Complications: no  Cardiovascular status: blood pressure returned to baseline and hemodynamically stable  Respiratory status: acceptable and spontaneous ventilation  Hydration status: euvolemic

## 2022-07-07 NOTE — PROGRESS NOTES
Patient admitted to General acute hospital room 5 with wife at bedside. Bed in low position side rails up call light in reach. Patient denies questions at this time.

## 2022-07-07 NOTE — PROGRESS NOTES
Pt has met discharge criteria and states he is ready for discharge to home. IV removed, gauze and tape applied. Dressed in own clothes and personal belongings gathered. Discharge instructions (with opioid medication education information) given to pt and family. Pt and family verbalized understanding of discharge instructions, prescriptions and follow up appointments. Pt transported to discharge lobby by [facility] staff.

## 2022-07-07 NOTE — OP NOTE
Davina Quigley  RECORD OF OPERATION  PATIENT NAME: Torrey Urena  RECORD NO. [id number]  SURGEON: Georgi Carmona. [facility]  PRIMARY CARE PHYSICIAN: Julien Salgado MD     PROCEDURE PERFORMED:  7/7/2022  PREOPERATIVE DIAGNOSIS:  Left inguinal hernia [K40.90]  POSTOPERATIVE DIAGNOSES:  Direct left inguinal hernia. PROCEDURE PERFORMED:  left inguinal hernia repair with mesh. SURGEON:  Dr. Georgi Craft  ANESTHESIA: general  ESTIMATED BLOOD LOSS:   10 ml. SPECIMENS:  None. COMPLICATIONS:  None immediately appreciated. DISCUSSION:  Lord Cerda is a 50y.o. year old male who presented to my office, seen in evaluation at the request of Julien Salgado MD regarding a left} inguinal hernia. After history physical examination was performed, potential diagnostic and therapeutic modalities discussed with the patient, operative and non operative management discussed. Laparoscopic, open procedure, use of mesh reviewed. He was given an opportunity to ask questions, once answered informed consent was obtained. The patient brought to the operating room on 7/7/2022 for the procedure. OPERATIVE FINDINGS: At the time of exploration, the patient was found to have a direct left inguinal hernia which was repaired as described below. PROCEDURE:  The patient was brought to the operating room, placed in a supine position, placed under continuous cardiac telemetry, blood pressure, pulse oximetry monitoring and placed under general anesthesia by the Anesthesia Department. The anterior abdominal wall was prepped and draped in a sterile fashion. A left inguinal incision made with a #10 scalpel blade and carried down subcutaneous tissues.  Subcutaneous tissue dissection carried out with electrocautery down the external aponeurosis which was then incised along its fibers using Metzenbaum scissors allowing safe entry into the canal. The cord and its contents grasped and elevated off the floor and a Penrose drain placed for manipulation purposes. There was no evidence of indirect hernia. The floor exhibited a direct defect which was then scored using electrocautery and reduced adequately. The transverse aponeurosis was then reapproximated using 0 Prolene suture. A Marlex patch fashioned to size attached to the pubic tubercle, inguinal ligament, transversalis abdominal muscle in a non constricting fascia on the cord using  2-0 Prolene and 2-0 Vicryl suture. The cord and its contents placed back into the canal. The external aponeurosis was then closed using a running 2-0 Vicryl suture an ilioinguinal nerve block performing and skin edges infiltrated with local anesthetic. Miah's fascia approximated using 2-0 Vicryl suture, deep dermis approximated using 3-0 Vicryl sutures. Skin closed using 4-0 Monocryl suture in running subcuticular fashion. The wound was then cleansed. Mastisol, Steri-Strips, sterile dressings were applied. Patient brought out of anesthesia, transferred to PACU in stable and satisfactory condition. No immediate  complication evident. All sponge, instrument and needle counts were correct at completion of the procedure. Postoperative findings discussed with the patient's family. He was given discharge instructions, prescriptions for analgesics and will follow up in my office in a two week period of time for reevaluation.       Electronically signed by Tiarra Maurer DO on 7/7/2022 at 9:29 AM

## 2022-07-07 NOTE — PROGRESS NOTES
5152: pt arrives to pacu. Pt on room air. OPA in place. Pt responds to verbal stimulation. VSS. Respirations unlabored. x1 surgical site on abdomen. Ice applied. 6484: OPA removed. Pt on room air. Pt coughing frequently   0935: pt states pain is 2/10. Pt states pain is tolerable   0944: pt sitting up in bed eating ice chips   0952: pt meets discharge criteria from pacu.  Pt transported to hospitals

## 2022-07-07 NOTE — ANESTHESIA PRE PROCEDURE
Department of Anesthesiology  Preprocedure Note       Name:  Dary Stack   Age:  50 y.o.  :  1970                                          MRN:  [id number]         Date:  2022      Surgeon: May Gann):  Dalila Hodges DO    Procedure: Procedure(s):  Left Inguinal Hernia Repair with Mesh    Medications prior to admission:   Prior to Admission medications    Medication Sig Start Date End Date Taking? Authorizing Provider   lisinopril (PRINIVIL;ZESTRIL) 20 MG tablet Take 1 tablet by mouth daily 21   Antelmo Hwang MD       Current medications:    Current Facility-Administered Medications   Medication Dose Route Frequency Provider Last Rate Last Admin    0.9 % sodium chloride infusion   IntraVENous Continuous Monique Craft DO        sodium chloride flush 0.9 % injection 5-40 mL  5-40 mL IntraVENous 2 times per day Monique Craft, DO        sodium chloride flush 0.9 % injection 5-40 mL  5-40 mL IntraVENous PRN Monique Craft, DO        0.9 % sodium chloride infusion   IntraVENous PRN Monique Craft DO        ceFAZolin (ANCEF) 2000 mg in dextrose 5 % 50 mL IVPB  2,000 mg IntraVENous On Call to 2000 Christine Jaramillo DO           Allergies:  No Known Allergies    Problem List:  There is no problem list on file for this patient. Past Medical History:        Diagnosis Date    Hypertension        Past Surgical History:        Procedure Laterality Date    CYST REMOVAL      Lt Wrist    [facility]    SPINE SURGERY  2016    OIS - C4,5,6    VASECTOMY         Social History:    Social History     Tobacco Use    Smoking status: Current Every Day Smoker     Packs/day: 1.00     Years: 15.00     Pack years: 15.00     Types: Cigarettes    Smokeless tobacco: Never Used    Tobacco comment: 10/18/19 down to 1ppd   Substance Use Topics    Alcohol use:  Yes     Alcohol/week: 6.0 standard drinks     Types: 6 Cans of beer per week     Comment: everyday Ready to quit: Not Answered  Counseling given: Not Answered  Comment: 10/18/19 down to 1ppd      Vital Signs (Current):   Vitals:    07/07/22 0729   BP: 134/80   Pulse: 66   Resp: 16   Temp: 97.7 °F (36.5 °C)   TempSrc: Temporal   SpO2: 98%   Weight: 214 lb 9.6 oz (97.3 kg)   Height: 6' 2\" (1.88 m)                                              BP Readings from Last 3 Encounters:   07/07/22 134/80   04/04/22 (!) 144/84   04/01/22 122/72       NPO Status:                                                                                 BMI:   Wt Readings from Last 3 Encounters:   07/07/22 214 lb 9.6 oz (97.3 kg)   04/04/22 227 lb (103 kg)   04/01/22 222 lb 3.2 oz (100.8 kg)     Body mass index is 27.55 kg/m². CBC:   Lab Results   Component Value Date/Time    WBC 7.2 06/10/2022 07:09 AM    RBC 5.30 06/10/2022 07:09 AM    HGB 16.8 06/10/2022 07:09 AM    HCT 51.5 06/10/2022 07:09 AM    MCV 97.2 06/10/2022 07:09 AM     06/10/2022 07:09 AM       CMP:   Lab Results   Component Value Date/Time     06/10/2022 07:09 AM    K 5.1 06/10/2022 07:09 AM     06/10/2022 07:09 AM    CO2 28 06/10/2022 07:09 AM    BUN 12 06/10/2022 07:09 AM    CREATININE 0.9 06/10/2022 07:09 AM    LABGLOM 89 06/10/2022 07:09 AM    GLUCOSE 116 06/10/2022 07:09 AM    PROT 7.1 06/10/2022 07:09 AM    CALCIUM 9.6 06/10/2022 07:09 AM    BILITOT 0.6 06/10/2022 07:09 AM    ALKPHOS 70 06/10/2022 07:09 AM    AST 23 06/10/2022 07:09 AM    ALT 32 06/10/2022 07:09 AM       POC Tests: No results for input(s): POCGLU, POCNA, POCK, POCCL, POCBUN, POCHEMO, POCHCT in the last 72 hours.     Coags: No results found for: PROTIME, INR, APTT    HCG (If Applicable): No results found for: PREGTESTUR, PREGSERUM, HCG, HCGQUANT     ABGs: No results found for: PHART, PO2ART, IDF2IMJ, XAI9FWZ, BEART, [id number]     Type & Screen (If Applicable):  No results found for: LABABO, LABRH    Drug/Infectious Status (If Applicable):  No results found for: HIV, HEPCAB    COVID-19 Screening (If Applicable): No results found for: COVID19        Anesthesia Evaluation   no history of anesthetic complications:   Airway: Mallampati: II  TM distance: >3 FB   Neck ROM: full  Mouth opening: > = 3 FB   Dental:          Pulmonary:normal exam              Patient did not smoke on day of surgery. Cardiovascular:  Exercise tolerance: good (>4 METS),   (+) hypertension:,                   Neuro/Psych:   Negative Neuro/Psych ROS              GI/Hepatic/Renal: Neg GI/Hepatic/Renal ROS            Endo/Other: Negative Endo/Other ROS             Pt had no PAT visit       Abdominal:             Vascular: negative vascular ROS. Other Findings:           Anesthesia Plan      general     ASA 2       Induction: intravenous. MIPS: Postoperative opioids intended and Prophylactic antiemetics administered. Anesthetic plan and risks discussed with patient. Plan discussed with CRNA.                     Soy Stone MD   7/7/2022

## 2022-07-07 NOTE — H&P
Sukhdeep Leblanc. [facility], [address] [place] [address]  [phone]  New Patient Evaluation in Office    Pt Name: Misael Austin  Date of Birth 1970   Medical Record Number: [id number]  Referring Provider: Rj Garcia MD  Primary Care Provider: Jed Miller MD  Chief Complaint   Patient presents with   [facility]     New patient-referred by Dr Jaime Fournier inguinal hernia     ASSESSMENT       Diagnosis Orders   1. Inguinal hernia, left     2. Essential hypertension     3. Pre-op testing       Past Medical History:   Diagnosis Date    Hypertension           PLANS      1. Schedule [place] for left inguinal hernia repair with mesh  2. I had a discussion with the patient regarding potential therapies for hernias and the risks of hernia surgery to include bleeding, infection, recurrence, injury to surrounding structures and organs, and continued pain in the area. We also discussed the use of mesh and its advantages and disadvantages. We discussed the anesthetic options, conduct of the operation, outpatient status, post op recovery and length of time off of work. After this discussion, the patient's questions were answered and has elected to proceed with surgical repair. 3. Status: outpatient  4. Planned anesthesia: general  5. He will undergo pre-operative clearance per anesthesia guidelines with risk factors listed under the past medical history diagnosis & problem list.     Astrid Munoz is a 50 y.o. male seen in the consultation for evaluation of a left inguinal hernia. Symptoms were first noted 3 months ago and has gradually worsened since that time. The pain is dull, intermittent, mild in severity. It is aggravated by Valsalva maneuvers and palliated by rest. He has no additional symptoms. He denies signs or symptoms of incarceration or strangulation. He has had prior right inguinal hernia surgery.  This hernia is not diaphoresis, fatigue, fever and unexpected weight change. HENT: Negative for congestion, dental problem, drooling, ear discharge, ear pain, facial swelling, hearing loss, mouth sores, nosebleeds, postnasal drip, rhinorrhea, sinus pressure, sinus pain, sneezing, sore throat, tinnitus, trouble swallowing and voice change. Eyes: Negative for photophobia, pain, discharge, redness, itching and visual disturbance. Respiratory: Negative for apnea, cough, choking, chest tightness, shortness of breath, wheezing and stridor. Cardiovascular: Negative for chest pain, palpitations and leg swelling. Gastrointestinal: Positive for abdominal pain (intermittent left groin). Negative for abdominal distention, anal bleeding, blood in stool, constipation, diarrhea, nausea, rectal pain and vomiting. Endocrine: Negative for cold intolerance, heat intolerance, polydipsia, polyphagia and polyuria. Genitourinary: Negative for decreased urine volume, difficulty urinating, dysuria, enuresis, flank pain, frequency, genital sores, hematuria, penile discharge, penile pain, penile swelling, scrotal swelling, testicular pain and urgency. Musculoskeletal: Negative for arthralgias, back pain, gait problem, joint swelling, myalgias, neck pain and neck stiffness. Skin: Negative for color change, pallor, rash and wound. Allergic/Immunologic: Negative for environmental allergies, food allergies and immunocompromised state. Neurological: Negative for dizziness, tremors, seizures, syncope, facial asymmetry, speech difficulty, weakness, light-headedness, numbness and headaches. Hematological: Negative for adenopathy. Does not bruise/bleed easily. Psychiatric/Behavioral: Negative for agitation, behavioral problems, confusion, decreased concentration, dysphoric mood, hallucinations, self-injury, sleep disturbance and suicidal ideas. The patient is not nervous/anxious and is not hyperactive.       OBJECTIVE    VITALS:  height is 6' 2\" (1.88 m) and weight is 222 lb 3.2 oz (100.8 kg). His tympanic temperature is 97.2 °F (36.2 °C). His blood pressure is 122/72 and his pulse is 82. His respiration is 14 and oxygen saturation is 98%. Pain Score:   2 Body mass index is 28.53 kg/m². CONSTITUTIONAL: Alert and oriented times 3, no acute distress and cooperative to examination with proper mood and affect. SKIN: Skin color, texture, turgor normal. No rashes or lesions. LYMPH: no cervical nodes, no inguinal nodes  HEENT: Head is normocephalic, atraumatic. EOMI, PERRLA. NECK: Supple, symmetrical, trachea midline, no adenopathy, thyroid symmetric, not enlarged and no tenderness, skin normal.  CHEST/LUNGS: chest symmetric with normal A/P diameter, normal respiratory rate and rhythm, lungs clear to auscultation without wheezes, rales or rhonchi. No accessory muscle use. Scars None   CARDIOVASCULAR: Heart sounds are normal.  Regular rate and rhythm without murmur, gallop or rub. Normal S1 and S2. Carotid and femoral pulses 2+/4 and equal bilaterally. ABDOMEN: Normal shape. Laparoscopic scar(s) present. Normal bowel sounds. No bruits. soft, nontender, nondistended, no masses or organomegaly. left inguinal hernia present which is reducible. Percussion: Normal without hepatosplenomegally. RECTAL: deferred, not clinically indicated  NEUROLOGIC: There are no focalizing motor or sensory deficits. CN II-XII are grossly intact. Jennyfer Karst EXTREMITIES: no cyanosis, no clubbing and no edema. Thank you for the interesting evaluation. Further recommendations as listed above. Electronically signed by DO Rosie Melo DO [facility] GENERAL SURGERY  History and Physical Update    Pt Name: Danna Lane  MRN: [id number]  YOB: 1970  Date of evaluation: 7/7/2022    I have examined the patient and reviewed the H&P/Consult and there are no changes to the patient or plans.          Electronically signed by Rosie Martin DO on 7/7/2022 at 8:11 AM

## 2022-07-07 NOTE — PROGRESS NOTES
Pt returned to [facility] room 5. Vitals and assessment as charted. Report receieved from PACU. Pt has crackers and soda. Family at the bedside. Pt and family verbalized understanding of discharge criteria and call light use. Call light in reach.

## 2022-07-08 ENCOUNTER — TELEPHONE (OUTPATIENT)
Dept: SURGERY | Age: 52
End: 2022-07-08

## 2022-07-08 NOTE — TELEPHONE ENCOUNTER
Pt voices pain controlled, only has tenderness with standing and sitting. Pt voices site is without redness. Pt voices he is eating, drinking fine. Voices urinating well and will take stool softeners as needed. Notified to drink fluids and call with any concerns.

## 2022-07-29 ENCOUNTER — OFFICE VISIT (OUTPATIENT)
Dept: SURGERY | Age: 52
End: 2022-07-29

## 2022-07-29 VITALS
DIASTOLIC BLOOD PRESSURE: 80 MMHG | RESPIRATION RATE: 18 BRPM | OXYGEN SATURATION: 98 % | HEART RATE: 74 BPM | TEMPERATURE: 97.5 F | HEIGHT: 74 IN | SYSTOLIC BLOOD PRESSURE: 132 MMHG | WEIGHT: 214 LBS | BODY MASS INDEX: 27.46 KG/M2

## 2022-07-29 DIAGNOSIS — Z98.890 S/P LEFT INGUINAL HERNIORRHAPHY: Primary | ICD-10-CM

## 2022-07-29 DIAGNOSIS — Z87.19 S/P LEFT INGUINAL HERNIORRHAPHY: Primary | ICD-10-CM

## 2022-07-29 PROCEDURE — 99024 POSTOP FOLLOW-UP VISIT: CPT | Performed by: SURGERY

## 2022-07-29 NOTE — PROGRESS NOTES
Rickey Quispe. [facility], [address] [place], [place] [id number] [phone]  Post Procedure Evaluation in Office    Pt Name: Katharine Villarreal  Date of Birth 1970   Today's Date: 7/29/2022  Medical Record Number: [id number]  Referring Provider: No ref. provider found  Primary Care Provider: Zaina Rasheed MD  Chief Complaint   Patient presents with    Post-Op Check     s/p left inguinal hernia repair with mesh-7/7/2022     ASSESSMENT       Diagnosis Orders   1. S/P left inguinal herniorrhaphy      7/7/22      Incision is clean, dry and intact or healing as expected   PLANS      Pathology reviewed with the patient who understands. All questions were answered. Patient Instructions   No lifting, pushing or pulling more than 30 lbs for 1 week, then 50 lbs for 2 weeks, then 80 lbs for 2 weeks. No restrictions after 5 weeks. Follow up: Return for As needed. Instructed to call if any concerns. Юлия Sánchez is seen today for post-op follow-up. He is S/P left inguinal hernia repair with mesh 7/7/22. He is tolerating a regular diet, having regular bowel movements. Symptoms and activity have gradually improved compared to preoperative. The surgical site is clean and has no drainage. Pain is controlled without any narcotic pain medications. He has compliant with postoperative instructions.   Past Medical History  Past Medical History:   Diagnosis Date    Hypertension      Past Surgical History  Past Surgical History:   Procedure Laterality Date    CYST REMOVAL  1989    Lt Wrist    HERNIA REPAIR  04/17/2008    [facility]    HERNIA REPAIR Left 7/7/2022    Left Inguinal Hernia Repair with Mesh performed by Constanza Garcia DO at [address]   03/2016    OIS - C4,5,6    VASECTOMY  2000     Medications  Current Outpatient Medications   Medication Sig Dispense Refill    lisinopril (PRINIVIL;ZESTRIL) 20 MG tablet Take 1 tablet by mouth daily 90 tablet 3     No current facility-administered medications for this visit. Allergies  has No Known Allergies. Social History   reports that he has been smoking cigarettes. He has a 15.00 pack-year smoking history. He has never used smokeless tobacco. He reports current alcohol use of about 6.0 standard drinks per week. He reports that he does not use drugs. Health Screening Exams  Health Maintenance   Topic Date Due    COVID-19 Vaccine (1) Never done    Pneumococcal 0-64 years Vaccine (1 - PCV) Never done    HIV screen  Never done    Hepatitis C screen  Never done    Shingles vaccine (1 of 2) Never done    A1C test (Diabetic or Prediabetic)  07/29/2022    Depression Screen  07/29/2022    Flu vaccine (1) 09/01/2022    Lipids  06/10/2027    DTaP/Tdap/Td vaccine (2 - Td or Tdap) 03/20/2028    Colorectal Cancer Screen  06/20/2032    Hepatitis A vaccine  Aged Out    Hepatitis B vaccine  Aged Out    Hib vaccine  Aged Out    Meningococcal (ACWY) vaccine  Aged Out     Review of Systems  History obtained from the patient. Constitutional: Denies any fever, chills, fatigue. Wound: Denies any rash, skin color changes or wound problems. Resp: Denies any cough, shortness of breath. CV: Denies any chest pain, orthopnea or syncope. GI: Denies any nausea, vomiting, blood in the stool, constipation or diarrhea. Positive for incisional discomfort only. : Denies any hematuria, hesitancy or dysuria. OBJECTIVE    VITALS:  height is 6' 2\" (1.88 m) and weight is 214 lb (97.1 kg). His temporal temperature is 97.5 °F (36.4 °C). His blood pressure is 132/80 and his pulse is 74. His respiration is 18 and oxygen saturation is 98%. Pain Score:   0 - No pain  CONSTITUTIONAL: Alert and oriented times 3, no acute distress and cooperative to examination. SKIN: Skin color, texture, turgor normal. No rashes or lesions. INCISION: wound margins intact and healing well. No signs of infection. No drainage.   ABDOMEN: Soft, nondistended, no masses or organomegaly. Bowel sounds normal. left groin scar(s) present with prominent healing ridge. No sign of recurrence, hematoma or seroma. Tenderness to palpation incisional only. NEUROLOGIC: No sensory or motor nerve irritation  MALE : bilateral testes normal, no scrotal swelling or edema       Thank you for the interesting evaluation. Further recommendations as listed above.        Electronically signed by Constanza Garcia DO on 7/29/2022 at 2:46 PM

## 2022-07-29 NOTE — LETTER
Kenyatta Orozco,   [address] [place] [address] [phone]     Pt Name: Jenna Torres Record Number: [id number]  Date of Birth 1970   Today's Date: 7/29/2022    Billy Pineda was evaluated in the office today. My assessment and plans are listed below. ASSESSMENT      Diagnosis Orders   1. S/P left inguinal herniorrhaphy      7/7/22      Incision is clean, dry and intact or healing as expected  PLANS:     Pathology reviewed with the patient who understands. All questions were answered. Patient Instructions   No lifting, pushing or pulling more than 30 lbs for 1 week, then 50 lbs for 2 weeks, then 80 lbs for 2 weeks. No restrictions after 5 weeks. Follow up: Return for As needed. Instructed to call if any concerns. If I can provide any additional assistance or you have any concerns, please feel free to contact me. Thank you for allowing to participate in the care of your patients. Sincerely,      Beulah Fernandez.  Anton Craft

## 2022-07-29 NOTE — PATIENT INSTRUCTIONS
No lifting, pushing or pulling more than 30 lbs for 1 week, then 50 lbs for 2 weeks, then 80 lbs for 2 weeks. No restrictions after 5 weeks.

## 2022-11-07 DIAGNOSIS — I10 ESSENTIAL HYPERTENSION: ICD-10-CM

## 2022-11-07 RX ORDER — LISINOPRIL 20 MG/1
TABLET ORAL
Qty: 90 TABLET | Refills: 1 | Status: SHIPPED | OUTPATIENT
Start: 2022-11-07

## 2023-08-07 DIAGNOSIS — I10 ESSENTIAL HYPERTENSION: ICD-10-CM

## 2023-08-07 RX ORDER — LISINOPRIL 20 MG/1
TABLET ORAL
Qty: 90 TABLET | Refills: 1 | OUTPATIENT
Start: 2023-08-07

## 2023-08-08 DIAGNOSIS — I10 ESSENTIAL HYPERTENSION: ICD-10-CM

## 2023-08-08 RX ORDER — LISINOPRIL 20 MG/1
20 TABLET ORAL DAILY
Qty: 10 TABLET | Refills: 0 | Status: SHIPPED | OUTPATIENT
Start: 2023-08-08

## 2023-08-15 ENCOUNTER — OFFICE VISIT (OUTPATIENT)
Dept: FAMILY MEDICINE CLINIC | Age: 53
End: 2023-08-15
Payer: COMMERCIAL

## 2023-08-15 ENCOUNTER — TELEPHONE (OUTPATIENT)
Dept: FAMILY MEDICINE CLINIC | Age: 53
End: 2023-08-15

## 2023-08-15 VITALS
RESPIRATION RATE: 16 BRPM | BODY MASS INDEX: 27.98 KG/M2 | HEART RATE: 76 BPM | TEMPERATURE: 98.1 F | HEIGHT: 74 IN | OXYGEN SATURATION: 98 % | WEIGHT: 218 LBS | SYSTOLIC BLOOD PRESSURE: 114 MMHG | DIASTOLIC BLOOD PRESSURE: 84 MMHG

## 2023-08-15 DIAGNOSIS — I10 ESSENTIAL HYPERTENSION: ICD-10-CM

## 2023-08-15 DIAGNOSIS — Z00.00 ROUTINE GENERAL MEDICAL EXAMINATION AT A HEALTH CARE FACILITY: Primary | ICD-10-CM

## 2023-08-15 DIAGNOSIS — Z12.5 SCREENING PSA (PROSTATE SPECIFIC ANTIGEN): ICD-10-CM

## 2023-08-15 PROCEDURE — 3074F SYST BP LT 130 MM HG: CPT | Performed by: FAMILY MEDICINE

## 2023-08-15 PROCEDURE — 99396 PREV VISIT EST AGE 40-64: CPT | Performed by: FAMILY MEDICINE

## 2023-08-15 PROCEDURE — 3079F DIAST BP 80-89 MM HG: CPT | Performed by: FAMILY MEDICINE

## 2023-08-15 RX ORDER — LISINOPRIL 20 MG/1
20 TABLET ORAL DAILY
Qty: 90 TABLET | Refills: 3 | Status: SHIPPED | OUTPATIENT
Start: 2023-08-15

## 2023-08-15 SDOH — ECONOMIC STABILITY: FOOD INSECURITY: WITHIN THE PAST 12 MONTHS, THE FOOD YOU BOUGHT JUST DIDN'T LAST AND YOU DIDN'T HAVE MONEY TO GET MORE.: NEVER TRUE

## 2023-08-15 SDOH — ECONOMIC STABILITY: HOUSING INSECURITY
IN THE LAST 12 MONTHS, WAS THERE A TIME WHEN YOU DID NOT HAVE A STEADY PLACE TO SLEEP OR SLEPT IN A SHELTER (INCLUDING NOW)?: YES

## 2023-08-15 SDOH — ECONOMIC STABILITY: FOOD INSECURITY: WITHIN THE PAST 12 MONTHS, YOU WORRIED THAT YOUR FOOD WOULD RUN OUT BEFORE YOU GOT MONEY TO BUY MORE.: NEVER TRUE

## 2023-08-15 SDOH — ECONOMIC STABILITY: INCOME INSECURITY: HOW HARD IS IT FOR YOU TO PAY FOR THE VERY BASICS LIKE FOOD, HOUSING, MEDICAL CARE, AND HEATING?: NOT HARD AT ALL

## 2023-08-15 ASSESSMENT — PATIENT HEALTH QUESTIONNAIRE - PHQ9
SUM OF ALL RESPONSES TO PHQ QUESTIONS 1-9: 0
1. LITTLE INTEREST OR PLEASURE IN DOING THINGS: 0
SUM OF ALL RESPONSES TO PHQ QUESTIONS 1-9: 0
SUM OF ALL RESPONSES TO PHQ9 QUESTIONS 1 & 2: 0
SUM OF ALL RESPONSES TO PHQ QUESTIONS 1-9: 0
SUM OF ALL RESPONSES TO PHQ QUESTIONS 1-9: 0
2. FEELING DOWN, DEPRESSED OR HOPELESS: 0

## 2023-08-15 ASSESSMENT — ENCOUNTER SYMPTOMS
EYE PAIN: 0
SORE THROAT: 0
DIARRHEA: 0
SINUS PRESSURE: 0
SHORTNESS OF BREATH: 0
NAUSEA: 0
COUGH: 0
ABDOMINAL DISTENTION: 0
RHINORRHEA: 0
ABDOMINAL PAIN: 0
CONSTIPATION: 0

## 2023-08-15 NOTE — PROGRESS NOTES
postnasal drip, rhinorrhea, sinus pressure and sore throat. Eyes:  Negative for pain and visual disturbance. Respiratory:  Negative for cough and shortness of breath. Cardiovascular:  Negative for chest pain. Gastrointestinal:  Negative for abdominal distention, abdominal pain, constipation, diarrhea and nausea. Genitourinary:  Negative for dysuria, frequency and urgency. Musculoskeletal:  Negative for arthralgias. Skin:  Negative for rash. Neurological:  Negative for dizziness. Past Medical History:   Diagnosis Date    Hypertension       Past Surgical History:   Procedure Laterality Date    CYST REMOVAL  1989    Lt Wrist    HERNIA REPAIR  04/17/2008    [facility]    HERNIA REPAIR Left 07/07/2022    Left Inguinal Hernia Repair with Mesh performed by Ayla Carrasco DO at [address] [facility] 03/2023    [address] [place] [address]  03/2016    OIS - C4,5,6    VASECTOMY  2000     Family History   Problem Relation Age of Onset    High Blood Pressure Father     High Cholesterol Father     Heart Disease Father     Heart Disease Maternal Aunt     Cancer Maternal Grandfather         lung    Stroke Maternal Grandfather     Heart Disease Maternal Grandfather      Social History     Tobacco Use    Smoking status: Every Day     Packs/day: 1.00     Years: 15.00     Pack years: 15.00     Types: Cigarettes    Smokeless tobacco: Current    Tobacco comments:     10/18/19 down to 1ppd   Substance Use Topics    Alcohol use: Yes     Alcohol/week: 6.0 standard drinks     Types: 6 Cans of beer per week     Comment: everyday      Current Outpatient Medications   Medication Sig Dispense Refill    lisinopril (PRINIVIL;ZESTRIL) 20 MG tablet Take 1 tablet by mouth daily 90 tablet 3     No current facility-administered medications for this visit.      No Known Allergies  Health Maintenance   Topic Date Due    COVID-19 Vaccine (1) Never done    Pneumococcal 0-64 years Vaccine (1 - PCV) Never done    Depression Screen

## 2023-08-18 ENCOUNTER — NURSE ONLY (OUTPATIENT)
Dept: LAB | Age: 53
End: 2023-08-18

## 2023-08-18 DIAGNOSIS — Z00.00 ROUTINE GENERAL MEDICAL EXAMINATION AT A HEALTH CARE FACILITY: ICD-10-CM

## 2023-08-18 DIAGNOSIS — I10 ESSENTIAL HYPERTENSION: ICD-10-CM

## 2023-08-18 DIAGNOSIS — Z12.5 SCREENING PSA (PROSTATE SPECIFIC ANTIGEN): ICD-10-CM

## 2023-08-18 LAB
ALBUMIN SERPL BCG-MCNC: 4.9 G/DL (ref 3.5–5.1)
ALP SERPL-CCNC: 75 U/L (ref 38–126)
ALT SERPL W/O P-5'-P-CCNC: 32 U/L (ref 11–66)
ANION GAP SERPL CALC-SCNC: 10 MEQ/L (ref 8–16)
AST SERPL-CCNC: 24 U/L (ref 5–40)
BASOPHILS ABSOLUTE: 0.1 THOU/MM3 (ref 0–0.1)
BASOPHILS NFR BLD AUTO: 0.8 %
BILIRUB SERPL-MCNC: 0.8 MG/DL (ref 0.3–1.2)
BUN SERPL-MCNC: 10 MG/DL (ref 7–22)
CALCIUM SERPL-MCNC: 10 MG/DL (ref 8.5–10.5)
CHLORIDE SERPL-SCNC: 100 MEQ/L (ref 98–111)
CHOLEST SERPL-MCNC: 181 MG/DL (ref 100–199)
CO2 SERPL-SCNC: 30 MEQ/L (ref 23–33)
CREAT SERPL-MCNC: 0.9 MG/DL (ref 0.4–1.2)
DEPRECATED RDW RBC AUTO: 44.6 FL (ref 35–45)
EOSINOPHIL NFR BLD AUTO: 1.8 %
EOSINOPHILS ABSOLUTE: 0.1 THOU/MM3 (ref 0–0.4)
ERYTHROCYTE [DISTWIDTH] IN BLOOD BY AUTOMATED COUNT: 12.4 % (ref 11.5–14.5)
GFR SERPL CREATININE-BSD FRML MDRD: > 60 ML/MIN/1.73M2
GLUCOSE SERPL-MCNC: 115 MG/DL (ref 70–108)
HCT VFR BLD AUTO: 51.5 % (ref 42–52)
HDLC SERPL-MCNC: 44 MG/DL
HGB BLD-MCNC: 17 GM/DL (ref 14–18)
IMM GRANULOCYTES # BLD AUTO: 0.02 THOU/MM3 (ref 0–0.07)
IMM GRANULOCYTES NFR BLD AUTO: 0.3 %
LDLC SERPL CALC-MCNC: 118 MG/DL
LYMPHOCYTES ABSOLUTE: 1.9 THOU/MM3 (ref 1–4.8)
LYMPHOCYTES NFR BLD AUTO: 29.1 %
MCH RBC QN AUTO: 32.3 PG (ref 26–33)
MCHC RBC AUTO-ENTMCNC: 33 GM/DL (ref 32.2–35.5)
MCV RBC AUTO: 97.9 FL (ref 80–94)
MONOCYTES ABSOLUTE: 0.7 THOU/MM3 (ref 0.4–1.3)
MONOCYTES NFR BLD AUTO: 10.9 %
NEUTROPHILS NFR BLD AUTO: 57.1 %
NRBC BLD AUTO-RTO: 0 /100 WBC
PLATELET # BLD AUTO: 242 THOU/MM3 (ref 130–400)
PMV BLD AUTO: 10 FL (ref 9.4–12.4)
POTASSIUM SERPL-SCNC: 4.5 MEQ/L (ref 3.5–5.2)
PROT SERPL-MCNC: 7.4 G/DL (ref 6.1–8)
PSA SERPL-MCNC: 0.32 NG/ML (ref 0–1)
RBC # BLD AUTO: 5.26 MILL/MM3 (ref 4.7–6.1)
SEGMENTED NEUTROPHILS ABSOLUTE COUNT: 3.7 THOU/MM3 (ref 1.8–7.7)
SODIUM SERPL-SCNC: 140 MEQ/L (ref 135–145)
TRIGL SERPL-MCNC: 96 MG/DL (ref 0–199)
WBC # BLD AUTO: 6.5 THOU/MM3 (ref 4.8–10.8)

## 2024-06-05 ENCOUNTER — OFFICE VISIT (OUTPATIENT)
Dept: FAMILY MEDICINE CLINIC | Age: 54
End: 2024-06-05
Payer: COMMERCIAL

## 2024-06-05 VITALS
BODY MASS INDEX: 26.68 KG/M2 | OXYGEN SATURATION: 99 % | DIASTOLIC BLOOD PRESSURE: 80 MMHG | TEMPERATURE: 98.1 F | WEIGHT: 207.8 LBS | HEART RATE: 83 BPM | SYSTOLIC BLOOD PRESSURE: 126 MMHG

## 2024-06-05 DIAGNOSIS — M54.50 ACUTE BILATERAL LOW BACK PAIN WITHOUT SCIATICA: Primary | ICD-10-CM

## 2024-06-05 PROCEDURE — 99214 OFFICE O/P EST MOD 30 MIN: CPT | Performed by: NURSE PRACTITIONER

## 2024-06-05 RX ORDER — CYCLOBENZAPRINE HCL 10 MG
10 TABLET ORAL 3 TIMES DAILY PRN
Qty: 30 TABLET | Refills: 0 | Status: SHIPPED | OUTPATIENT
Start: 2024-06-05 | End: 2024-06-15

## 2024-06-05 RX ORDER — PREDNISONE 20 MG/1
20 TABLET ORAL 2 TIMES DAILY
Qty: 10 TABLET | Refills: 0 | Status: SHIPPED | OUTPATIENT
Start: 2024-06-05 | End: 2024-06-10

## 2024-06-05 RX ORDER — HYDROCODONE BITARTRATE AND ACETAMINOPHEN 5; 325 MG/1; MG/1
1 TABLET ORAL EVERY 8 HOURS PRN
Qty: 9 TABLET | Refills: 0 | Status: SHIPPED | OUTPATIENT
Start: 2024-06-05 | End: 2024-06-08

## 2024-06-05 ASSESSMENT — ENCOUNTER SYMPTOMS
EYE DISCHARGE: 0
EYE REDNESS: 0
SORE THROAT: 0
CONSTIPATION: 0
RHINORRHEA: 0
WHEEZING: 0
VOMITING: 0
BACK PAIN: 1
DIARRHEA: 0
TROUBLE SWALLOWING: 0
EYE PAIN: 0
NAUSEA: 0
SHORTNESS OF BREATH: 0
ALLERGIC/IMMUNOLOGIC NEGATIVE: 1
ABDOMINAL PAIN: 0
COUGH: 0

## 2024-06-05 ASSESSMENT — PATIENT HEALTH QUESTIONNAIRE - PHQ9: DEPRESSION UNABLE TO ASSESS: URGENT/EMERGENT SITUATION

## 2024-06-05 NOTE — PROGRESS NOTES
[facility] PROFESSIONAL SERVS  [facility]  [address] [address]  Dept: [phone]  Dept Fax: [phone]  Loc: [phone]     Visit Date:  6/5/2024      Patient:  Los Santillan  YOB: 1970    HPI:     Chief Complaint   Patient presents with    Back Pain     L lower back; denies any urinary symptoms or injury. Has had this for about a month; could hardly get off the mower yesterday and gets labored breathing. Took ibuprofen 800 for relief; did not relieve him. Did take oxycodone that relieved the pain. Interrupting his sleep        L lower back; denies any urinary symptoms or injury. Has had this for about a month; could hardly get off the mower yesterday and gets labored breathing. Took ibuprofen 800 for relief; did not relieve him. Did take oxycodone that relieved the pain. Interrupting his sleep.  Denies radiculopathy or cauda equina symptoms.      Back Pain  Pertinent negatives include no abdominal pain, dysuria, fever, headaches or weakness.       Medications    Current Outpatient Medications:     HYDROcodone-acetaminophen (NORCO) 5-325 MG per tablet, Take 1 tablet by mouth every 8 hours as needed for Pain for up to 3 days. Intended supply: 3 days. Take lowest dose possible to manage pain Max Daily Amount: 3 tablets, Disp: 9 tablet, Rfl: 0    cyclobenzaprine (FLEXERIL) 10 MG tablet, Take 1 tablet by mouth 3 times daily as needed for Muscle spasms, Disp: 30 tablet, Rfl: 0    predniSONE (DELTASONE) 20 MG tablet, Take 1 tablet by mouth 2 times daily for 5 days, Disp: 10 tablet, Rfl: 0    lisinopril (PRINIVIL;ZESTRIL) 20 MG tablet, Take 1 tablet by mouth daily, Disp: 90 tablet, Rfl: 3    The patient has No Known Allergies.    Past Medical History  Los  has a past medical history of Hypertension.    Subjective:      Review of Systems   Constitutional:  Negative for activity change, fatigue and fever.   HENT:  Negative for congestion, ear

## 2025-02-21 ENCOUNTER — OFFICE VISIT (OUTPATIENT)
Dept: FAMILY MEDICINE CLINIC | Age: 55
End: 2025-02-21
Payer: COMMERCIAL

## 2025-02-21 VITALS
WEIGHT: 234.5 LBS | DIASTOLIC BLOOD PRESSURE: 88 MMHG | BODY MASS INDEX: 30.1 KG/M2 | HEIGHT: 74 IN | RESPIRATION RATE: 18 BRPM | SYSTOLIC BLOOD PRESSURE: 136 MMHG | OXYGEN SATURATION: 95 % | HEART RATE: 84 BPM

## 2025-02-21 DIAGNOSIS — Z00.00 WELL ADULT EXAM: Primary | ICD-10-CM

## 2025-02-21 DIAGNOSIS — I10 ESSENTIAL HYPERTENSION: ICD-10-CM

## 2025-02-21 DIAGNOSIS — Z12.5 SCREENING PSA (PROSTATE SPECIFIC ANTIGEN): ICD-10-CM

## 2025-02-21 PROCEDURE — 3079F DIAST BP 80-89 MM HG: CPT | Performed by: FAMILY MEDICINE

## 2025-02-21 PROCEDURE — 99396 PREV VISIT EST AGE 40-64: CPT | Performed by: FAMILY MEDICINE

## 2025-02-21 PROCEDURE — 3075F SYST BP GE 130 - 139MM HG: CPT | Performed by: FAMILY MEDICINE

## 2025-02-21 RX ORDER — LISINOPRIL 20 MG/1
20 TABLET ORAL DAILY
Qty: 90 TABLET | Refills: 3 | Status: SHIPPED | OUTPATIENT
Start: 2025-02-21

## 2025-02-21 SDOH — ECONOMIC STABILITY: FOOD INSECURITY: WITHIN THE PAST 12 MONTHS, YOU WORRIED THAT YOUR FOOD WOULD RUN OUT BEFORE YOU GOT MONEY TO BUY MORE.: NEVER TRUE

## 2025-02-21 SDOH — ECONOMIC STABILITY: FOOD INSECURITY: WITHIN THE PAST 12 MONTHS, THE FOOD YOU BOUGHT JUST DIDN'T LAST AND YOU DIDN'T HAVE MONEY TO GET MORE.: NEVER TRUE

## 2025-02-21 ASSESSMENT — ENCOUNTER SYMPTOMS
ABDOMINAL PAIN: 0
ABDOMINAL DISTENTION: 0
DIARRHEA: 0
SORE THROAT: 0
NAUSEA: 0
SHORTNESS OF BREATH: 0
EYE PAIN: 0
RHINORRHEA: 0
SINUS PRESSURE: 0
CONSTIPATION: 0
COUGH: 0

## 2025-02-21 ASSESSMENT — PATIENT HEALTH QUESTIONNAIRE - PHQ9
2. FEELING DOWN, DEPRESSED OR HOPELESS: NOT AT ALL
1. LITTLE INTEREST OR PLEASURE IN DOING THINGS: NOT AT ALL
SUM OF ALL RESPONSES TO PHQ QUESTIONS 1-9: 0
SUM OF ALL RESPONSES TO PHQ QUESTIONS 1-9: 0
SUM OF ALL RESPONSES TO PHQ9 QUESTIONS 1 & 2: 0
SUM OF ALL RESPONSES TO PHQ QUESTIONS 1-9: 0
SUM OF ALL RESPONSES TO PHQ QUESTIONS 1-9: 0

## 2025-02-21 NOTE — PROGRESS NOTES
normal.      Breath sounds: Normal breath sounds. No wheezing or rales.   Musculoskeletal:         General: No tenderness.   Skin:     General: Skin is warm and dry.      Coloration: Skin is not pale.   Neurological:      Mental Status: He is alert and oriented to person, place, and time.       /88   Pulse 84   Resp 18   Ht 1.88 m (6' 2\")   Wt 106.4 kg (234 lb 8 oz)   SpO2 95%   BMI 30.11 kg/m²       Patient giveneducational materials - see patient instructions.  Discussed use, benefit, and side effects of prescribed medications.  All patient questions answered.  Pt voiced understanding. Reviewed health maintenance. Patient agreedwith treatment plan. Follow up as directed.   PDMP Monitoring:    Last PDMP Dragan as Reviewed:  Review User Review Instant Review Result            Urine Drug Screenings (1 yr)    No resulted procedures found.       Medication Contract and Consent for Opioid Use Documents Filed        No documents found                   **This report has been created using voice recognition software. It may contain minor errorswhich are inherent in voice recognition technology.**  The patient (or guardian, if applicable) and other individuals in attendance with the patient were advised that Artificial Intelligence will be utilized during this visit to record, process the conversation to generate a clinical note, and support improvement of the AI technology. The patient (or guardian, if applicable) and other individuals in attendance at the appointment consented to the use of AI, including the recording.          The patient (or guardian, if applicable) and other individuals in attendance with the patient were advised that Artificial Intelligence will be utilized during this visit to record, process the conversation to generate a clinical note, and support improvement of the AI technology. The patient (or guardian, if applicable) and other individuals in attendance at the appointment consented to

## 2025-02-24 ENCOUNTER — LAB (OUTPATIENT)
Dept: LAB | Age: 55
End: 2025-02-24

## 2025-02-24 DIAGNOSIS — I10 ESSENTIAL HYPERTENSION: ICD-10-CM

## 2025-02-24 DIAGNOSIS — Z12.5 SCREENING PSA (PROSTATE SPECIFIC ANTIGEN): ICD-10-CM

## 2025-02-24 LAB
ALBUMIN SERPL BCG-MCNC: 4.9 G/DL (ref 3.5–5.1)
ALP SERPL-CCNC: 65 U/L (ref 38–126)
ALT SERPL W/O P-5'-P-CCNC: 25 U/L (ref 11–66)
ANION GAP SERPL CALC-SCNC: 17 MEQ/L (ref 8–16)
AST SERPL-CCNC: 19 U/L (ref 5–40)
BASOPHILS ABSOLUTE: 0 THOU/MM3 (ref 0–0.1)
BASOPHILS NFR BLD AUTO: 0.8 %
BILIRUB SERPL-MCNC: 0.6 MG/DL (ref 0.3–1.2)
BUN SERPL-MCNC: 12 MG/DL (ref 7–22)
CALCIUM SERPL-MCNC: 9.9 MG/DL (ref 8.2–9.6)
CHLORIDE SERPL-SCNC: 100 MEQ/L (ref 98–111)
CHOLEST SERPL-MCNC: 186 MG/DL (ref 100–199)
CO2 SERPL-SCNC: 25 MEQ/L (ref 23–33)
CREAT SERPL-MCNC: 0.9 MG/DL (ref 0.4–1.2)
DEPRECATED RDW RBC AUTO: 43.2 FL (ref 35–45)
EOSINOPHIL NFR BLD AUTO: 1.6 %
EOSINOPHILS ABSOLUTE: 0.1 THOU/MM3 (ref 0–0.4)
ERYTHROCYTE [DISTWIDTH] IN BLOOD BY AUTOMATED COUNT: 12.5 % (ref 11.5–14.5)
GFR SERPL CREATININE-BSD FRML MDRD: > 90 ML/MIN/1.73M2
GLUCOSE SERPL-MCNC: 110 MG/DL (ref 70–108)
HCT VFR BLD AUTO: 49.8 % (ref 42–52)
HDLC SERPL-MCNC: 44 MG/DL
HGB BLD-MCNC: 16.9 GM/DL (ref 14–18)
IMM GRANULOCYTES # BLD AUTO: 0.01 THOU/MM3 (ref 0–0.07)
IMM GRANULOCYTES NFR BLD AUTO: 0.2 %
LDLC SERPL CALC-MCNC: 119 MG/DL
LYMPHOCYTES ABSOLUTE: 1.7 THOU/MM3 (ref 1–4.8)
LYMPHOCYTES NFR BLD AUTO: 28.4 %
MCH RBC QN AUTO: 31.9 PG (ref 26–33)
MCHC RBC AUTO-ENTMCNC: 33.9 GM/DL (ref 32.2–35.5)
MCV RBC AUTO: 94.1 FL (ref 80–94)
MONOCYTES ABSOLUTE: 0.6 THOU/MM3 (ref 0.4–1.3)
MONOCYTES NFR BLD AUTO: 9.6 %
NEUTROPHILS ABSOLUTE: 3.6 THOU/MM3 (ref 1.8–7.7)
NEUTROPHILS NFR BLD AUTO: 59.4 %
NRBC BLD AUTO-RTO: 0 /100 WBC
PLATELET # BLD AUTO: 222 THOU/MM3 (ref 130–400)
PMV BLD AUTO: 9.7 FL (ref 9.4–12.4)
POTASSIUM SERPL-SCNC: 5.5 MEQ/L (ref 3.5–5.2)
PROT SERPL-MCNC: 6.9 G/DL (ref 6.1–8)
PSA SERPL-MCNC: 0.3 NG/ML (ref 0–1)
RBC # BLD AUTO: 5.29 MILL/MM3 (ref 4.7–6.1)
SODIUM SERPL-SCNC: 142 MEQ/L (ref 135–145)
TRIGL SERPL-MCNC: 116 MG/DL (ref 0–199)
WBC # BLD AUTO: 6.1 THOU/MM3 (ref 4.8–10.8)

## (undated) DEVICE — SUTURE VCRL + SZ 4-0 L27IN ABSRB WHT FS-2 3/8 CIR REV CUT VCP422H

## (undated) DEVICE — PENROSE DRAIN 18 X .5" SILICONE: Brand: MEDLINE

## (undated) DEVICE — SUTURE VCRL + SZ 3-0 L27IN ABSRB WHT CT-1 1/2 CIR VCP258H

## (undated) DEVICE — PENCIL SMK EVAC ALL IN 1 DSGN ENH VISIBILITY IMPROVED AIR

## (undated) DEVICE — SUTURE VCRL + SZ 2-0 L27IN ABSRB CLR CT-1 1/2 CIR TAPERCUT VCP259H

## (undated) DEVICE — BREAST HERNIA PACK: Brand: MEDLINE INDUSTRIES, INC.

## (undated) DEVICE — GLOVE ORANGE PI 7 1/2   MSG9075

## (undated) DEVICE — SUTURE PROL SZ 2-0 L30IN NONABSORBABLE BLU L26MM CT-1 1/2 8423H